# Patient Record
Sex: MALE | Employment: PART TIME | URBAN - METROPOLITAN AREA
[De-identification: names, ages, dates, MRNs, and addresses within clinical notes are randomized per-mention and may not be internally consistent; named-entity substitution may affect disease eponyms.]

---

## 2017-01-04 ENCOUNTER — OFFICE VISIT (OUTPATIENT)
Dept: FAMILY MEDICINE CLINIC | Age: 32
End: 2017-01-04

## 2017-01-04 VITALS
HEIGHT: 74 IN | WEIGHT: 275 LBS | RESPIRATION RATE: 18 BRPM | SYSTOLIC BLOOD PRESSURE: 120 MMHG | HEART RATE: 106 BPM | OXYGEN SATURATION: 97 % | TEMPERATURE: 97.5 F | BODY MASS INDEX: 35.29 KG/M2 | DIASTOLIC BLOOD PRESSURE: 83 MMHG

## 2017-01-04 DIAGNOSIS — L03.032 PARONYCHIA OF GREAT TOE OF LEFT FOOT: Primary | ICD-10-CM

## 2017-01-04 RX ORDER — CEPHALEXIN 500 MG/1
500 CAPSULE ORAL 4 TIMES DAILY
Qty: 40 CAP | Refills: 0 | Status: SHIPPED | OUTPATIENT
Start: 2017-01-04 | End: 2017-01-14

## 2017-01-04 NOTE — PATIENT INSTRUCTIONS
Take the Keflex and return in about a week for recheck. Use some gentle moisturizer on the right great toe. Ingrown Toenail: Care Instructions  Your Care Instructions    An ingrown toenail often occurs because a nail is not trimmed correctly or because shoes are too tight. An ingrown nail can cause an infection. If your toe is infected, your doctor may prescribe antibiotics. Most ingrown toenails can be treated at home. You should trim toenails straight across, so the ends of the nail grow over the skin and not into it. Good nail care can prevent ingrown toenails. Follow-up care is a key part of your treatment and safety. Be sure to make and go to all appointments, and call your doctor if you are having problems. It's also a good idea to know your test results and keep a list of the medicines you take. How can you care for yourself at home? · Trim the nails straight across. Leave the corners a little longer so they do not cut into the skin. To do this when you have an ingrown nail:  ¨ Soak your foot in warm water for about 15 minutes to soften the nail. ¨ Wedge a small piece of wet cotton under the corner of the nail to cushion the nail and lift it slightly. This keeps it from cutting the skin. ¨ Repeat daily until the nail has grown out and can be trimmed. · Do not use manicure scissors to dig under the ingrown nail. You might stab your toe, which could get infected. · Do not trim your toenails too short. · Check with your doctor before trimming your own toenails if you have been diagnosed with diabetes or peripheral arterial disease. These conditions increase the risk of an infection, because you may have decreased sensation in your toes and cut yourself without knowing it. · Wear roomy, comfortable shoes. · If your doctor prescribed antibiotics, take them as directed. Do not stop taking them just because you feel better. You need to take the full course of antibiotics.   When should you call for help? Call your doctor now or seek immediate medical care if:  · You have signs of infection, such as:  ¨ Increased pain, swelling, warmth, or redness. ¨ Red streaks leading from the toe. ¨ Pus draining from the toe. ¨ A fever. Watch closely for changes in your health, and be sure to contact your doctor if:  · You have problems trimming your nails. · You do not get better as expected. Where can you learn more? Go to http://eufemia-dakota.info/. Enter R135 in the search box to learn more about \"Ingrown Toenail: Care Instructions. \"  Current as of: May 27, 2016  Content Version: 11.1  © 1691-1292 [employer]. Care instructions adapted under license by [employer] (which disclaims liability or warranty for this information). If you have questions about a medical condition or this instruction, always ask your healthcare professional. [place] [address] any warranty or liability for your use of this information. A healthy gut contains many species of bacteria and yeast that can help with the breakdown of starches, and improve the function of the immune system (probiotics). Good gut aria also help with the absorption of nutrients such as vitamins A,D,E, and K, as well as calcium, iron, and chromium. Probiotic foods or supplements help to add microorganisms to the stomach and intestines. These foods include fermented foods such as yogurt, sauerkraut, kefir, kimchi, natto, and miso. Prebiotics are foods that help feed the existing microorganisms in the gut. These include bananas, artichokes, leeks, garlic, and onions. http://authoritynutrition. com/probiotics-101/  https://authoritynutrition.com/19-best-prebiotic-foods/  https://authoritynutrition. com/8-health-benefits-of-probiotics/

## 2017-01-04 NOTE — PROGRESS NOTES
HISTORY OF PRESENT ILLNESS  Tracy Ritchie is a 28 y.o. male. HPI Comments: Pt presents with his mom with c/o left great toe pain, redness, drainage that has been ongoing for about 2 weeks. Denies any history of ingrown toenails. He does not recall injuring the toe in the past couple of weeks. Toe Pain   Pertinent negatives include no chest pain and no shortness of breath. Review of Systems   Constitutional: Negative for chills, fever and malaise/fatigue. Respiratory: Negative for shortness of breath. Cardiovascular: Negative for chest pain. Musculoskeletal: Negative for joint pain. Neurological: Negative for tingling and weakness. Physical Exam   Constitutional: He is oriented to person, place, and time. He appears well-developed and well-nourished. No distress. HENT:   Head: Normocephalic and atraumatic. Right Ear: External ear normal.   Left Ear: External ear normal.   Nose: Nose normal. No nasal deformity. Eyes: Conjunctivae are normal.   Neck: Neck supple. Cardiovascular: Regular rhythm and normal heart sounds. Tachycardia present. Exam reveals no gallop and no friction rub. No murmur heard. Pulses:       Radial pulses are 2+ on the right side, and 2+ on the left side. Dorsalis pedis pulses are 2+ on the right side, and 2+ on the left side. Mild tachycardia  Good capillary refill in the toes   Pulmonary/Chest: Effort normal and breath sounds normal. No respiratory distress. He has no decreased breath sounds. He has no wheezes. He has no rhonchi. He has no rales. Lymphadenopathy:     He has no cervical adenopathy. Neurological: He is alert and oriented to person, place, and time. Skin: Skin is warm and dry. He is not diaphoretic. Ingrown toenail: left great toe with tenderness on the lateral aspect (toward the second toe); erythema surrounding the cuticle to about 1 cm back, and serous drainage from between the nail bed and plate.      There is also some peeling of the skin on the right great toe, with redish/purplish coloration of the skin, but no rufus erythema, no tenderness or drainage. Psychiatric: He has a normal mood and affect. His speech is normal and behavior is normal. Thought content normal.   Nursing note and vitals reviewed. ASSESSMENT and PLAN    ICD-10-CM ICD-9-CM    1. Paronychia of great toe of left foot L03.032 681.11 cephALEXin (KEFLEX) 500 mg capsule     Recommendations: Take the Keflex and return in about a week for recheck. Use some gentle moisturizer on the right great toe. Provided after-visit information on  Ingrown Toenail  Reviewed reasons to return or seek emergency care. Pt verbalized understanding and agreement with the plan of care.     Vanessa Bnetley PA-C

## 2017-01-04 NOTE — PROGRESS NOTES
Patient presents to the clinic for left great toe pain. Patient complains of redness, swelling, discoloration and drainage. Patient states he has tried bacitracin and received no relief.

## 2017-01-04 NOTE — MR AVS SNAPSHOT
Visit Information Date & Time Provider Department Dept. Phone Encounter #  
 1/4/2017  2:15 PM Marianna Baker PA-C [facility] [phone] [id number] Follow-up Instructions Return in about 1 week (around 1/11/2017). Upcoming Health Maintenance Date Due Pneumococcal 19-64 Highest Risk (1 of 3 - PCV13) 12/10/2005 DTaP/Tdap/Td series (1 - Tdap) 12/10/2007 INFLUENZA AGE 9 TO ADULT 8/1/2016 Allergies as of 1/4/2017  Review Complete On: 1/4/2017 By: Marianna Baker PA-C No Known Allergies Current Immunizations  Never Reviewed No immunizations on file. Not reviewed this visit You Were Diagnosed With   
  
 Codes Comments Paronychia of great toe of left foot    -  Primary ICD-10-CM: C68.511 
ICD-9-CM: 681.11 Vitals BP Pulse Temp Resp Height(growth percentile) Weight(growth percentile) 120/83 (BP 1 Location: Right arm, BP Patient Position: Sitting) (!) 106 97.5 °F (36.4 °C) (Oral) 18 6' 2\" (1.88 m) 275 lb (124.7 kg) SpO2 BMI Smoking Status 97% 35.31 kg/m2 Former Smoker BMI and BSA Data Body Mass Index Body Surface Area  
 35.31 kg/m 2 2.55 m 2 Preferred Pharmacy Pharmacy Name Phone [facility], [address] [phone] Your Updated Medication List  
  
   
This list is accurate as of: 1/4/17  3:02 PM.  Always use your most recent med list.  
  
  
  
  
 benztropine 1 mg tablet Commonly known as:  COGENTIN Take 1 mg by mouth two (2) times a day. cephALEXin 500 mg capsule Commonly known as:  Amedeo Ronde Take 1 Cap by mouth four (4) times daily for 10 days. cloZAPine 100 mg tablet Commonly known as:  CLOZARIL  
250 mg daily. 250 mg for two weeks then 200 mg for another two weeks then 150 mg for another two weeks COLACE 100 mg capsule Generic drug:  docusate sodium Take 100 mg by mouth two (2) times a day. FLUoxetine 40 mg capsule Commonly known as:  PROzac Take 40 mg by mouth daily. multivitamin tablet Commonly known as:  ONE A DAY Take 1 Tab by mouth daily. OLANZapine 20 mg tablet Commonly known as:  ZyPREXA Take 20 mg by mouth nightly. VITAMIN D3 1,000 unit tablet Generic drug:  cholecalciferol Take  by mouth daily. Prescriptions Sent to Pharmacy Refills  
 cephALEXin (KEFLEX) 500 mg capsule 0 Sig: Take 1 Cap by mouth four (4) times daily for 10 days. Class: Normal  
 Pharmacy: [facility] [address] [place] [address], [address] [place] [address] #: [phone] Route: Oral  
  
Follow-up Instructions Return in about 1 week (around 1/11/2017). Patient Instructions Take the Keflex and return in about a week for recheck. Use some gentle moisturizer on the right great toe. Ingrown Toenail: Care Instructions Your Care Instructions An ingrown toenail often occurs because a nail is not trimmed correctly or because shoes are too tight. An ingrown nail can cause an infection. If your toe is infected, your doctor may prescribe antibiotics. Most ingrown toenails can be treated at home. You should trim toenails straight across, so the ends of the nail grow over the skin and not into it. Good nail care can prevent ingrown toenails. Follow-up care is a key part of your treatment and safety. Be sure to make and go to all appointments, and call your doctor if you are having problems. It's also a good idea to know your test results and keep a list of the medicines you take. How can you care for yourself at home? · Trim the nails straight across. Leave the corners a little longer so they do not cut into the skin. To do this when you have an ingrown nail: 
¨ Soak your foot in warm water for about 15 minutes to soften the nail. ¨ Wedge a small piece of wet cotton under the corner of the nail to cushion the nail and lift it slightly. This keeps it from cutting the skin. ¨ Repeat daily until the nail has grown out and can be trimmed. · Do not use manicure scissors to dig under the ingrown nail. You might stab your toe, which could get infected. · Do not trim your toenails too short. · Check with your doctor before trimming your own toenails if you have been diagnosed with diabetes or peripheral arterial disease. These conditions increase the risk of an infection, because you may have decreased sensation in your toes and cut yourself without knowing it. · Wear roomy, comfortable shoes. · If your doctor prescribed antibiotics, take them as directed. Do not stop taking them just because you feel better. You need to take the full course of antibiotics. When should you call for help? Call your doctor now or seek immediate medical care if: 
· You have signs of infection, such as: 
¨ Increased pain, swelling, warmth, or redness. ¨ Red streaks leading from the toe. ¨ Pus draining from the toe. ¨ A fever. Watch closely for changes in your health, and be sure to contact your doctor if: 
· You have problems trimming your nails. · You do not get better as expected. Where can you learn more? Go to http://eufemia-dakota.info/. Enter R135 in the search box to learn more about \"Ingrown Toenail: Care Instructions. \" Current as of: May 27, 2016 Content Version: 11.1 © 6061-0629 [employer]. Care instructions adapted under license by [employer] (which disclaims liability or warranty for this information). If you have questions about a medical condition or this instruction, always ask your healthcare professional. [place] [address] any warranty or liability for your use of this information.  
 
A healthy gut contains many species of bacteria and yeast that can help with the breakdown of starches, and improve the function of the immune system (probiotics). Good gut aria also help with the absorption of nutrients such as vitamins A,D,E, and K, as well as calcium, iron, and chromium. Probiotic foods or supplements help to add microorganisms to the stomach and intestines. These foods include fermented foods such as yogurt, sauerkraut, kefir, kimchi, natto, and miso. Prebiotics are foods that help feed the existing microorganisms in the gut. These include bananas, artichokes, leeks, garlic, and onions. http://authoritynutrition. com/probiotics-101/ 
https://authoritynutrition.com/19-best-prebiotic-foods/ 
https://authoritySavveo. com/8-health-benefits-of-probiotics/ 
 
 
 
  
Introducing [facility] & HEALTH SERVICES! Dear Paige Orta: Thank you for requesting a [employer] account. Our records indicate that you already have an active [employer] account. You can access your account anytime at [email or web address] Did you know that you can access your hospital and ER discharge instructions at any time in [employer]? You can also review all of your test results from your hospital stay or ER visit. Additional Information If you have questions, please visit the Frequently Asked Questions section of the [employer] website at [email or web address] Remember, [employer] is NOT to be used for urgent needs. For medical emergencies, dial 911. Now available from your iPhone and Android! Please provide this summary of care documentation to your next provider. Your primary care clinician is listed as Chase Cortez. If you have any questions after today's visit, please call [phone].

## 2017-01-12 ENCOUNTER — HOSPITAL ENCOUNTER (OUTPATIENT)
Dept: LAB | Age: 32
Discharge: HOME | End: 2017-01-12
Payer: MEDICAID

## 2017-01-12 LAB
BASOPHILS # BLD AUTO: 0 K/UL (ref 0–0.06)
BASOPHILS # BLD: 1 % (ref 0–2)
DIFFERENTIAL METHOD BLD: NORMAL
EOSINOPHIL # BLD: 0.1 K/UL (ref 0–0.4)
EOSINOPHIL NFR BLD: 2 % (ref 0–5)
ERYTHROCYTE [DISTWIDTH] IN BLOOD BY AUTOMATED COUNT: 13.1 % (ref 11.6–14.5)
HCT VFR BLD AUTO: 46.1 % (ref 36–48)
HGB BLD-MCNC: 15.3 G/DL (ref 13–16)
LYMPHOCYTES # BLD AUTO: 31 % (ref 21–52)
LYMPHOCYTES # BLD: 1.9 K/UL (ref 0.9–3.6)
MCH RBC QN AUTO: 29.4 PG (ref 24–34)
MCHC RBC AUTO-ENTMCNC: 33.2 G/DL (ref 31–37)
MCV RBC AUTO: 88.5 FL (ref 74–97)
MONOCYTES # BLD: 0.3 K/UL (ref 0.05–1.2)
MONOCYTES NFR BLD AUTO: 5 % (ref 3–10)
NEUTS SEG # BLD: 3.7 K/UL (ref 1.8–8)
NEUTS SEG NFR BLD AUTO: 61 % (ref 40–73)
PLATELET # BLD AUTO: 274 K/UL (ref 135–420)
PMV BLD AUTO: 9.9 FL (ref 9.2–11.8)
RBC # BLD AUTO: 5.21 M/UL (ref 4.7–5.5)
WBC # BLD AUTO: 6 K/UL (ref 4.6–13.2)

## 2017-01-12 PROCEDURE — 85025 COMPLETE CBC W/AUTO DIFF WBC: CPT | Performed by: PSYCHIATRY & NEUROLOGY

## 2017-01-12 PROCEDURE — 36415 COLL VENOUS BLD VENIPUNCTURE: CPT | Performed by: PSYCHIATRY & NEUROLOGY

## 2017-01-26 ENCOUNTER — HOSPITAL ENCOUNTER (OUTPATIENT)
Dept: LAB | Age: 32
Discharge: HOME | End: 2017-01-26
Payer: MEDICAID

## 2017-01-26 LAB
BASOPHILS # BLD AUTO: 0 K/UL (ref 0–0.06)
BASOPHILS # BLD: 1 % (ref 0–2)
DIFFERENTIAL METHOD BLD: NORMAL
EOSINOPHIL # BLD: 0.1 K/UL (ref 0–0.4)
EOSINOPHIL NFR BLD: 2 % (ref 0–5)
ERYTHROCYTE [DISTWIDTH] IN BLOOD BY AUTOMATED COUNT: 13 % (ref 11.6–14.5)
HCT VFR BLD AUTO: 45.1 % (ref 36–48)
HGB BLD-MCNC: 15.4 G/DL (ref 13–16)
LYMPHOCYTES # BLD AUTO: 26 % (ref 21–52)
LYMPHOCYTES # BLD: 1.7 K/UL (ref 0.9–3.6)
MCH RBC QN AUTO: 30 PG (ref 24–34)
MCHC RBC AUTO-ENTMCNC: 34.1 G/DL (ref 31–37)
MCV RBC AUTO: 87.7 FL (ref 74–97)
MONOCYTES # BLD: 0.5 K/UL (ref 0.05–1.2)
MONOCYTES NFR BLD AUTO: 8 % (ref 3–10)
NEUTS SEG # BLD: 4.2 K/UL (ref 1.8–8)
NEUTS SEG NFR BLD AUTO: 63 % (ref 40–73)
PLATELET # BLD AUTO: 274 K/UL (ref 135–420)
PMV BLD AUTO: 9.4 FL (ref 9.2–11.8)
RBC # BLD AUTO: 5.14 M/UL (ref 4.7–5.5)
WBC # BLD AUTO: 6.6 K/UL (ref 4.6–13.2)

## 2017-01-26 PROCEDURE — 85025 COMPLETE CBC W/AUTO DIFF WBC: CPT | Performed by: PSYCHIATRY & NEUROLOGY

## 2017-01-26 PROCEDURE — 36415 COLL VENOUS BLD VENIPUNCTURE: CPT | Performed by: PSYCHIATRY & NEUROLOGY

## 2017-02-08 ENCOUNTER — HOSPITAL ENCOUNTER (OUTPATIENT)
Dept: LAB | Age: 32
Discharge: HOME | End: 2017-02-08
Payer: MEDICAID

## 2017-02-08 LAB
BASOPHILS # BLD AUTO: 0 K/UL (ref 0–0.06)
BASOPHILS # BLD: 0 % (ref 0–2)
DIFFERENTIAL METHOD BLD: NORMAL
EOSINOPHIL # BLD: 0.1 K/UL (ref 0–0.4)
EOSINOPHIL NFR BLD: 1 % (ref 0–5)
ERYTHROCYTE [DISTWIDTH] IN BLOOD BY AUTOMATED COUNT: 13 % (ref 11.6–14.5)
HCT VFR BLD AUTO: 44.7 % (ref 36–48)
HGB BLD-MCNC: 15.3 G/DL (ref 13–16)
LYMPHOCYTES # BLD AUTO: 30 % (ref 21–52)
LYMPHOCYTES # BLD: 2.2 K/UL (ref 0.9–3.6)
MCH RBC QN AUTO: 30.2 PG (ref 24–34)
MCHC RBC AUTO-ENTMCNC: 34.2 G/DL (ref 31–37)
MCV RBC AUTO: 88.2 FL (ref 74–97)
MONOCYTES # BLD: 0.2 K/UL (ref 0.05–1.2)
MONOCYTES NFR BLD AUTO: 3 % (ref 3–10)
NEUTS SEG # BLD: 4.8 K/UL (ref 1.8–8)
NEUTS SEG NFR BLD AUTO: 66 % (ref 40–73)
PLATELET # BLD AUTO: 251 K/UL (ref 135–420)
PMV BLD AUTO: 9.5 FL (ref 9.2–11.8)
RBC # BLD AUTO: 5.07 M/UL (ref 4.7–5.5)
WBC # BLD AUTO: 7.4 K/UL (ref 4.6–13.2)

## 2017-02-08 PROCEDURE — 36415 COLL VENOUS BLD VENIPUNCTURE: CPT | Performed by: PSYCHIATRY & NEUROLOGY

## 2017-02-08 PROCEDURE — 85025 COMPLETE CBC W/AUTO DIFF WBC: CPT | Performed by: PSYCHIATRY & NEUROLOGY

## 2017-06-13 ENCOUNTER — HOSPITAL ENCOUNTER (OUTPATIENT)
Dept: LAB | Age: 32
Discharge: HOME | End: 2017-06-13
Payer: SELF-PAY

## 2017-06-13 LAB
BASOPHILS # BLD AUTO: 0 K/UL (ref 0–0.06)
BASOPHILS # BLD: 0 % (ref 0–2)
DIFFERENTIAL METHOD BLD: ABNORMAL
EOSINOPHIL # BLD: 0.1 K/UL (ref 0–0.4)
EOSINOPHIL NFR BLD: 1 % (ref 0–5)
ERYTHROCYTE [DISTWIDTH] IN BLOOD BY AUTOMATED COUNT: 13.1 % (ref 11.6–14.5)
HCT VFR BLD AUTO: 38.9 % (ref 36–48)
HGB BLD-MCNC: 13.3 G/DL (ref 13–16)
LYMPHOCYTES # BLD AUTO: 29 % (ref 21–52)
LYMPHOCYTES # BLD: 1.9 K/UL (ref 0.9–3.6)
MCH RBC QN AUTO: 31.1 PG (ref 24–34)
MCHC RBC AUTO-ENTMCNC: 34.2 G/DL (ref 31–37)
MCV RBC AUTO: 90.9 FL (ref 74–97)
MONOCYTES # BLD: 0.3 K/UL (ref 0.05–1.2)
MONOCYTES NFR BLD AUTO: 5 % (ref 3–10)
NEUTS SEG # BLD: 4.3 K/UL (ref 1.8–8)
NEUTS SEG NFR BLD AUTO: 65 % (ref 40–73)
PLATELET # BLD AUTO: 269 K/UL (ref 135–420)
PMV BLD AUTO: 9.1 FL (ref 9.2–11.8)
RBC # BLD AUTO: 4.28 M/UL (ref 4.7–5.5)
WBC # BLD AUTO: 6.6 K/UL (ref 4.6–13.2)

## 2017-06-13 PROCEDURE — 85025 COMPLETE CBC W/AUTO DIFF WBC: CPT | Performed by: PSYCHIATRY & NEUROLOGY

## 2017-06-13 PROCEDURE — 36415 COLL VENOUS BLD VENIPUNCTURE: CPT | Performed by: PSYCHIATRY & NEUROLOGY

## 2017-06-19 ENCOUNTER — OFFICE VISIT (OUTPATIENT)
Dept: FAMILY MEDICINE CLINIC | Age: 32
End: 2017-06-19

## 2017-06-19 ENCOUNTER — HOSPITAL ENCOUNTER (OUTPATIENT)
Dept: LAB | Age: 32
Discharge: HOME | End: 2017-06-19
Payer: SELF-PAY

## 2017-06-19 VITALS
TEMPERATURE: 98.5 F | SYSTOLIC BLOOD PRESSURE: 122 MMHG | BODY MASS INDEX: 35.04 KG/M2 | RESPIRATION RATE: 18 BRPM | HEIGHT: 74 IN | DIASTOLIC BLOOD PRESSURE: 70 MMHG | OXYGEN SATURATION: 97 % | WEIGHT: 273 LBS | HEART RATE: 107 BPM

## 2017-06-19 DIAGNOSIS — E78.2 MIXED HYPERLIPIDEMIA: ICD-10-CM

## 2017-06-19 DIAGNOSIS — J02.9 SORE THROAT: ICD-10-CM

## 2017-06-19 DIAGNOSIS — E88.81 METABOLIC SYNDROME: Primary | ICD-10-CM

## 2017-06-19 DIAGNOSIS — Z79.899 HIGH RISK MEDICATION USE: ICD-10-CM

## 2017-06-19 LAB
ALBUMIN SERPL BCP-MCNC: 3.9 G/DL (ref 3.4–5)
ALBUMIN/GLOB SERPL: 1.3 {RATIO} (ref 0.8–1.7)
ALP SERPL-CCNC: 58 U/L (ref 45–117)
ALT SERPL-CCNC: 48 U/L (ref 16–61)
ANION GAP BLD CALC-SCNC: 9 MMOL/L (ref 3–18)
AST SERPL W P-5'-P-CCNC: 24 U/L (ref 15–37)
BILIRUB SERPL-MCNC: 0.4 MG/DL (ref 0.2–1)
BUN SERPL-MCNC: 19 MG/DL (ref 7–18)
BUN/CREAT SERPL: 19 (ref 12–20)
CALCIUM SERPL-MCNC: 9.3 MG/DL (ref 8.5–10.1)
CHLORIDE SERPL-SCNC: 102 MMOL/L (ref 100–108)
CHOLEST SERPL-MCNC: 125 MG/DL
CO2 SERPL-SCNC: 28 MMOL/L (ref 21–32)
CREAT SERPL-MCNC: 1.02 MG/DL (ref 0.6–1.3)
GLOBULIN SER CALC-MCNC: 3 G/DL (ref 2–4)
GLUCOSE SERPL-MCNC: 88 MG/DL (ref 74–99)
HBA1C MFR BLD HPLC: 5 %
HDLC SERPL-MCNC: 29 MG/DL (ref 40–60)
HDLC SERPL: 4.3 {RATIO} (ref 0–5)
LDLC SERPL CALC-MCNC: 60.8 MG/DL (ref 0–100)
LIPID PROFILE,FLP: ABNORMAL
POTASSIUM SERPL-SCNC: 4.3 MMOL/L (ref 3.5–5.5)
PROT SERPL-MCNC: 6.9 G/DL (ref 6.4–8.2)
S PYO AG THROAT QL: NEGATIVE
SODIUM SERPL-SCNC: 139 MMOL/L (ref 136–145)
TRIGL SERPL-MCNC: 176 MG/DL (ref ?–150)
VALID INTERNAL CONTROL?: YES
VLDLC SERPL CALC-MCNC: 35.2 MG/DL

## 2017-06-19 PROCEDURE — 80061 LIPID PANEL: CPT | Performed by: FAMILY MEDICINE

## 2017-06-19 PROCEDURE — 80053 COMPREHEN METABOLIC PANEL: CPT | Performed by: FAMILY MEDICINE

## 2017-06-19 RX ORDER — METFORMIN HYDROCHLORIDE 500 MG/1
TABLET ORAL
Refills: 0 | COMMUNITY
Start: 2017-06-01 | End: 2017-07-27 | Stop reason: SDUPTHER

## 2017-06-19 RX ORDER — DIVALPROEX SODIUM 500 MG/1
TABLET, DELAYED RELEASE ORAL
Refills: 0 | COMMUNITY
Start: 2017-06-01

## 2017-06-19 RX ORDER — LISINOPRIL 10 MG/1
TABLET ORAL
Refills: 0 | COMMUNITY
Start: 2017-06-01 | End: 2017-07-27 | Stop reason: SDUPTHER

## 2017-06-19 RX ORDER — FENOFIBRATE 48 MG/1
TABLET, COATED ORAL
Refills: 0 | COMMUNITY
Start: 2017-06-01 | End: 2017-07-31 | Stop reason: SDUPTHER

## 2017-06-19 NOTE — PROGRESS NOTES
HISTORY OF PRESENT ILLNESS  Thad Mead is a 28 y.o. male. HPI Comments: Toi Cruz is here to follow up medically with me. He was recently hospitalized at [facility] for mental health issues and was found to have metabolic syndrome. He was started on metformin, lisinopril and tricor a few months ago. He doesn't know what any of his numbers were. He is having no problems with the medications. He's also had a mild sore throat for a couple of days. No fever, chills. Medication Evaluation   Pertinent negatives include no chest pain, no abdominal pain, no headaches and no shortness of breath. Sore Throat    Pertinent negatives include no vomiting, no headaches, no shortness of breath and no cough. Review of Systems   Constitutional: Negative for chills and fever. HENT: Positive for sore throat. Eyes: Negative for blurred vision and double vision. Respiratory: Negative for cough and shortness of breath. Cardiovascular: Negative for chest pain and palpitations. Gastrointestinal: Negative for abdominal pain, nausea and vomiting. Genitourinary: Negative for dysuria and urgency. Neurological: Negative for headaches. Endo/Heme/Allergies: Does not bruise/bleed easily. Psychiatric/Behavioral: Negative for depression. Physical Exam   Constitutional: He is oriented to person, place, and time. He appears well-developed and well-nourished. HENT:   Throat not red   Eyes: Pupils are equal, round, and reactive to light. Neck: Neck supple. No thyromegaly present. Cardiovascular: Normal rate, regular rhythm and normal heart sounds. Pulmonary/Chest: Effort normal and breath sounds normal. No respiratory distress. He has no wheezes. He has no rales. Abdominal: Soft. He exhibits no distension. There is no tenderness. Lymphadenopathy:     He has no cervical adenopathy. Neurological: He is alert and oriented to person, place, and time.    Skin:   A couple of tiny cystic feeling lesions underneath the skin of his scalp. Nursing note and vitals reviewed. Results for orders placed or performed in visit on 06/19/17   AMB POC RAPID STREP A   Result Value Ref Range    VALID INTERNAL CONTROL POC Yes     Group A Strep Ag Negative Negative   AMB POC HEMOGLOBIN A1C   Result Value Ref Range    Hemoglobin A1c (POC) 5.0 %       ASSESSMENT and PLAN    ICD-10-CM ICD-9-CM    1. Metabolic syndrome C17.72 096.5    2. Mixed hyperlipidemia R85.1 180.5 METABOLIC PANEL, COMPREHENSIVE      LIPID PANEL   3. Sore throat J02.9 462 AMB POC RAPID STREP A      AMB POC HEMOGLOBIN A1C   4.  High risk medication use G71.623 T84.28 METABOLIC PANEL, COMPREHENSIVE

## 2017-06-19 NOTE — PROGRESS NOTES
Rm 1  Pt presents to the clinic for a follow-up. Pt was seen at [facility] and diagnosed with metabolic syndrome. Pt's mom states his psych doctor will prescribe the psych meds and wanted to get Dr. Cade Wilson on board with the others. Depression Screening Completed: yes    Learning Assessment Completed: yes    Abuse Screening Completed: n/a    Health Maintenance reviewed and discussed per provider: yes     Coordination of Care:    1. Have you been to the ER, urgent care clinic since your last visit? Hospitalized since your last visit? no    2. Have you seen or consulted any other health care providers outside of the [address] [place] [address] since your last visit? Include any pap smears or colon screening.  no

## 2017-06-19 NOTE — MR AVS SNAPSHOT
Visit Information Date & Time Provider Department Dept. Phone Encounter #  
 6/19/2017  1:30 PM [facility] [phone] [id number] Upcoming Health Maintenance Date Due Pneumococcal 19-64 Highest Risk (1 of 3 - PCV13) 12/10/2005 DTaP/Tdap/Td series (1 - Tdap) 12/10/2007 INFLUENZA AGE 9 TO ADULT 8/1/2017 Allergies as of 6/19/2017  Review Complete On: 6/19/2017 By: Codey Napier MD  
 No Known Allergies Current Immunizations  Never Reviewed No immunizations on file. Not reviewed this visit You Were Diagnosed With   
  
 Codes Comments Metabolic syndrome    -  Primary ICD-10-CM: I16.39 ICD-9-CM: 277.7 Mixed hyperlipidemia     ICD-10-CM: E78.2 ICD-9-CM: 272.2 Sore throat     ICD-10-CM: J02.9 ICD-9-CM: 341 High risk medication use     ICD-10-CM: Z79.899 ICD-9-CM: V58.69 Vitals BP Pulse Temp Resp Height(growth percentile) Weight(growth percentile) 122/70 (BP 1 Location: Right arm, BP Patient Position: Sitting) (!) 107 98.5 °F (36.9 °C) (Oral) 18 6' 2\" (1.88 m) 273 lb (123.8 kg) SpO2 BMI Smoking Status 97% 35.05 kg/m2 Former Smoker Vitals History BMI and BSA Data Body Mass Index Body Surface Area 35.05 kg/m 2 2.54 m 2 Preferred Pharmacy Pharmacy Name Phone [facility], [address] [phone] Your Updated Medication List  
  
   
This list is accurate as of: 6/19/17  2:05 PM.  Always use your most recent med list.  
  
  
  
  
 cloZAPine 100 mg tablet Commonly known as:  CLOZARIL  
250 mg daily. 250 mg for two weeks then 200 mg for another two weeks then 150 mg for another two weeks  
  
 divalproex  mg tablet Commonly known as:  DEPAKOTE TK 1 T PO BID  
  
 fenofibrate nanocrystallized 48 mg tablet Commonly known as:  [employer] TK 1 T PO HS  
  
 lisinopril 10 mg tablet Commonly known as:  Jet Bold TK 1 T PO QAM  
  
 metFORMIN 500 mg tablet Commonly known as:  GLUCOPHAGE  
TK 1 T PO QAM  
  
 multivitamin tablet Commonly known as:  ONE A DAY Take 1 Tab by mouth daily. OLANZapine 20 mg tablet Commonly known as:  ZyPREXA Take 20 mg by mouth nightly. VITAMIN D3 1,000 unit tablet Generic drug:  cholecalciferol Take  by mouth daily. We Performed the Following AMB POC HEMOGLOBIN A1C [52375 CPT(R)] AMB POC RAPID STREP A [83962 CPT(R)] To-Do List   
 06/19/2017 Lab:  LIPID PANEL   
  
 06/19/2017 Lab:  METABOLIC PANEL, COMPREHENSIVE Patient Instructions We will call you tomorrow about the lab results. Continue everything the same. Introducing [facility] & HEALTH SERVICES! Dear Dell Cons: Thank you for requesting a [employer] account. Our records indicate that you already have an active [employer] account. You can access your account anytime at [email or web address] Did you know that you can access your hospital and ER discharge instructions at any time in [employer]? You can also review all of your test results from your hospital stay or ER visit. Additional Information If you have questions, please visit the Frequently Asked Questions section of the [employer] website at [email or web address] Remember, [employer] is NOT to be used for urgent needs. For medical emergencies, dial 911. Now available from your iPhone and Android! Please provide this summary of care documentation to your next provider. Your primary care clinician is listed as Chase Cortez. If you have any questions after today's visit, please call [phone].

## 2017-07-13 ENCOUNTER — HOSPITAL ENCOUNTER (OUTPATIENT)
Dept: LAB | Age: 32
Discharge: HOME | End: 2017-07-13
Payer: SELF-PAY

## 2017-07-13 LAB
BASOPHILS # BLD AUTO: 0 K/UL (ref 0–0.06)
BASOPHILS # BLD: 1 % (ref 0–2)
DIFFERENTIAL METHOD BLD: ABNORMAL
EOSINOPHIL # BLD: 0.1 K/UL (ref 0–0.4)
EOSINOPHIL NFR BLD: 2 % (ref 0–5)
ERYTHROCYTE [DISTWIDTH] IN BLOOD BY AUTOMATED COUNT: 12 % (ref 11.6–14.5)
HCT VFR BLD AUTO: 44.2 % (ref 36–48)
HGB BLD-MCNC: 15 G/DL (ref 13–16)
LYMPHOCYTES # BLD AUTO: 32 % (ref 21–52)
LYMPHOCYTES # BLD: 1.9 K/UL (ref 0.9–3.6)
MCH RBC QN AUTO: 31 PG (ref 24–34)
MCHC RBC AUTO-ENTMCNC: 33.9 G/DL (ref 31–37)
MCV RBC AUTO: 91.3 FL (ref 74–97)
MONOCYTES # BLD: 0.4 K/UL (ref 0.05–1.2)
MONOCYTES NFR BLD AUTO: 7 % (ref 3–10)
NEUTS SEG # BLD: 3.6 K/UL (ref 1.8–8)
NEUTS SEG NFR BLD AUTO: 58 % (ref 40–73)
PLATELET # BLD AUTO: 285 K/UL (ref 135–420)
PMV BLD AUTO: 9 FL (ref 9.2–11.8)
RBC # BLD AUTO: 4.84 M/UL (ref 4.7–5.5)
WBC # BLD AUTO: 6 K/UL (ref 4.6–13.2)

## 2017-07-13 PROCEDURE — 85025 COMPLETE CBC W/AUTO DIFF WBC: CPT | Performed by: PSYCHIATRY & NEUROLOGY

## 2017-07-13 PROCEDURE — 36415 COLL VENOUS BLD VENIPUNCTURE: CPT | Performed by: PSYCHIATRY & NEUROLOGY

## 2017-07-27 RX ORDER — METFORMIN HYDROCHLORIDE 500 MG/1
500 TABLET ORAL
Qty: 30 TAB | Refills: 2 | Status: SHIPPED | OUTPATIENT
Start: 2017-07-27 | End: 2017-11-07 | Stop reason: SDUPTHER

## 2017-07-27 RX ORDER — LISINOPRIL 10 MG/1
10 TABLET ORAL DAILY
Qty: 30 TAB | Refills: 2 | Status: SHIPPED | OUTPATIENT
Start: 2017-07-27 | End: 2017-11-07 | Stop reason: SDUPTHER

## 2017-07-27 NOTE — TELEPHONE ENCOUNTER
Please call pt (or mom) back and confirm the dose and regimen (qd, bid, etc.) for the lisinopril and metformin.

## 2017-07-27 NOTE — TELEPHONE ENCOUNTER
Spoke with patient's mom Tushar. Tushar stated her son takes 1 tablet everyday for both medications.

## 2017-07-27 NOTE — TELEPHONE ENCOUNTER
Requested Prescriptions     Pending Prescriptions Disp Refills    lisinopril (PRINIVIL, ZESTRIL) 10 mg tablet  0    metFORMIN (GLUCOPHAGE) 500 mg tablet  0     Pts mother stated she had run these meds by Dr Catarino Taylor and would let him know when it was getting to be the time to get them refilled. It looks as though they were prescribed by a historical provider, but she had discussed the matter with Dr Catarino Taylor.

## 2017-07-31 RX ORDER — FENOFIBRATE 48 MG/1
TABLET, COATED ORAL
Qty: 90 TAB | Refills: 0 | Status: SHIPPED | OUTPATIENT
Start: 2017-07-31 | End: 2017-09-18 | Stop reason: SDUPTHER

## 2017-08-07 ENCOUNTER — HOSPITAL ENCOUNTER (OUTPATIENT)
Dept: LAB | Age: 32
Discharge: HOME | End: 2017-08-07
Payer: MEDICAID

## 2017-08-07 LAB
25(OH)D3 SERPL-MCNC: 24.2 NG/ML (ref 30–100)
ALBUMIN SERPL BCP-MCNC: 3.9 G/DL (ref 3.4–5)
ALBUMIN/GLOB SERPL: 1.1 {RATIO} (ref 0.8–1.7)
ALP SERPL-CCNC: 54 U/L (ref 45–117)
ALT SERPL-CCNC: 25 U/L (ref 16–61)
ANION GAP BLD CALC-SCNC: 9 MMOL/L (ref 3–18)
AST SERPL W P-5'-P-CCNC: 19 U/L (ref 15–37)
BASOPHILS # BLD AUTO: 0 K/UL (ref 0–0.06)
BASOPHILS # BLD: 0 % (ref 0–2)
BILIRUB SERPL-MCNC: 0.5 MG/DL (ref 0.2–1)
BUN SERPL-MCNC: 20 MG/DL (ref 7–18)
BUN/CREAT SERPL: 18 (ref 12–20)
CALCIUM SERPL-MCNC: 9.3 MG/DL (ref 8.5–10.1)
CHLORIDE SERPL-SCNC: 105 MMOL/L (ref 100–108)
CHOLEST SERPL-MCNC: 122 MG/DL
CO2 SERPL-SCNC: 28 MMOL/L (ref 21–32)
CREAT SERPL-MCNC: 1.1 MG/DL (ref 0.6–1.3)
DIFFERENTIAL METHOD BLD: ABNORMAL
EOSINOPHIL # BLD: 0.1 K/UL (ref 0–0.4)
EOSINOPHIL NFR BLD: 1 % (ref 0–5)
ERYTHROCYTE [DISTWIDTH] IN BLOOD BY AUTOMATED COUNT: 11.8 % (ref 11.6–14.5)
GLOBULIN SER CALC-MCNC: 3.5 G/DL (ref 2–4)
GLUCOSE SERPL-MCNC: 84 MG/DL (ref 74–99)
HBA1C MFR BLD: 5.4 % (ref 4.2–5.6)
HCT VFR BLD AUTO: 41.9 % (ref 36–48)
HDLC SERPL-MCNC: 29 MG/DL (ref 40–60)
HDLC SERPL: 4.2 {RATIO} (ref 0–5)
HGB BLD-MCNC: 14.5 G/DL (ref 13–16)
LDLC SERPL CALC-MCNC: 64.4 MG/DL (ref 0–100)
LIPID PROFILE,FLP: ABNORMAL
LYMPHOCYTES # BLD AUTO: 32 % (ref 21–52)
LYMPHOCYTES # BLD: 1.6 K/UL (ref 0.9–3.6)
MCH RBC QN AUTO: 31.5 PG (ref 24–34)
MCHC RBC AUTO-ENTMCNC: 34.6 G/DL (ref 31–37)
MCV RBC AUTO: 90.9 FL (ref 74–97)
MONOCYTES # BLD: 0.3 K/UL (ref 0.05–1.2)
MONOCYTES NFR BLD AUTO: 5 % (ref 3–10)
NEUTS SEG # BLD: 3.1 K/UL (ref 1.8–8)
NEUTS SEG NFR BLD AUTO: 62 % (ref 40–73)
PLATELET # BLD AUTO: 256 K/UL (ref 135–420)
PMV BLD AUTO: 9.3 FL (ref 9.2–11.8)
POTASSIUM SERPL-SCNC: 4.8 MMOL/L (ref 3.5–5.5)
PROT SERPL-MCNC: 7.4 G/DL (ref 6.4–8.2)
RBC # BLD AUTO: 4.61 M/UL (ref 4.7–5.5)
SODIUM SERPL-SCNC: 142 MMOL/L (ref 136–145)
TRIGL SERPL-MCNC: 143 MG/DL (ref ?–150)
TSH SERPL DL<=0.05 MIU/L-ACNC: 1.42 UIU/ML (ref 0.36–3.74)
VLDLC SERPL CALC-MCNC: 28.6 MG/DL
WBC # BLD AUTO: 5.1 K/UL (ref 4.6–13.2)

## 2017-08-07 PROCEDURE — 36415 COLL VENOUS BLD VENIPUNCTURE: CPT | Performed by: PSYCHIATRY & NEUROLOGY

## 2017-08-07 PROCEDURE — 80053 COMPREHEN METABOLIC PANEL: CPT | Performed by: PSYCHIATRY & NEUROLOGY

## 2017-08-07 PROCEDURE — 80061 LIPID PANEL: CPT | Performed by: PSYCHIATRY & NEUROLOGY

## 2017-08-07 PROCEDURE — 80165 DIPROPYLACETIC ACID FREE: CPT | Performed by: PSYCHIATRY & NEUROLOGY

## 2017-08-07 PROCEDURE — 84443 ASSAY THYROID STIM HORMONE: CPT | Performed by: PSYCHIATRY & NEUROLOGY

## 2017-08-07 PROCEDURE — 83036 HEMOGLOBIN GLYCOSYLATED A1C: CPT | Performed by: PSYCHIATRY & NEUROLOGY

## 2017-08-07 PROCEDURE — 85025 COMPLETE CBC W/AUTO DIFF WBC: CPT | Performed by: PSYCHIATRY & NEUROLOGY

## 2017-08-07 PROCEDURE — 82306 VITAMIN D 25 HYDROXY: CPT | Performed by: PSYCHIATRY & NEUROLOGY

## 2017-08-09 LAB — VALPROATE FREE SERPL-MCNC: 10.5 UG/ML (ref 6–22)

## 2017-09-05 ENCOUNTER — HOSPITAL ENCOUNTER (OUTPATIENT)
Dept: LAB | Age: 32
Discharge: HOME | End: 2017-09-05
Payer: MEDICAID

## 2017-09-05 LAB
BASOPHILS # BLD: 0 K/UL (ref 0–0.06)
BASOPHILS NFR BLD: 0 % (ref 0–2)
DIFFERENTIAL METHOD BLD: ABNORMAL
EOSINOPHIL # BLD: 0.1 K/UL (ref 0–0.4)
EOSINOPHIL NFR BLD: 2 % (ref 0–5)
ERYTHROCYTE [DISTWIDTH] IN BLOOD BY AUTOMATED COUNT: 12.2 % (ref 11.6–14.5)
HCT VFR BLD AUTO: 42.7 % (ref 36–48)
HGB BLD-MCNC: 14.6 G/DL (ref 13–16)
LYMPHOCYTES # BLD: 1.8 K/UL (ref 0.9–3.6)
LYMPHOCYTES NFR BLD: 38 % (ref 21–52)
MCH RBC QN AUTO: 31.3 PG (ref 24–34)
MCHC RBC AUTO-ENTMCNC: 34.2 G/DL (ref 31–37)
MCV RBC AUTO: 91.4 FL (ref 74–97)
MONOCYTES # BLD: 0.3 K/UL (ref 0.05–1.2)
MONOCYTES NFR BLD: 7 % (ref 3–10)
NEUTS SEG # BLD: 2.6 K/UL (ref 1.8–8)
NEUTS SEG NFR BLD: 53 % (ref 40–73)
PLATELET # BLD AUTO: 250 K/UL (ref 135–420)
PMV BLD AUTO: 9.4 FL (ref 9.2–11.8)
RBC # BLD AUTO: 4.67 M/UL (ref 4.7–5.5)
WBC # BLD AUTO: 4.8 K/UL (ref 4.6–13.2)

## 2017-09-05 PROCEDURE — 85025 COMPLETE CBC W/AUTO DIFF WBC: CPT | Performed by: PSYCHIATRY & NEUROLOGY

## 2017-09-05 PROCEDURE — 36415 COLL VENOUS BLD VENIPUNCTURE: CPT | Performed by: PSYCHIATRY & NEUROLOGY

## 2017-09-18 ENCOUNTER — OFFICE VISIT (OUTPATIENT)
Dept: FAMILY MEDICINE CLINIC | Age: 32
End: 2017-09-18

## 2017-09-18 VITALS
HEIGHT: 74 IN | RESPIRATION RATE: 16 BRPM | DIASTOLIC BLOOD PRESSURE: 80 MMHG | HEART RATE: 85 BPM | BODY MASS INDEX: 35.55 KG/M2 | WEIGHT: 277 LBS | OXYGEN SATURATION: 96 % | SYSTOLIC BLOOD PRESSURE: 123 MMHG | TEMPERATURE: 96.8 F

## 2017-09-18 DIAGNOSIS — E88.81 METABOLIC SYNDROME: ICD-10-CM

## 2017-09-18 DIAGNOSIS — E55.9 VITAMIN D DEFICIENCY: ICD-10-CM

## 2017-09-18 DIAGNOSIS — E78.2 MIXED HYPERLIPIDEMIA: Primary | ICD-10-CM

## 2017-09-18 DIAGNOSIS — F25.9 SCHIZOAFFECTIVE DISORDER, UNSPECIFIED TYPE (HCC): ICD-10-CM

## 2017-09-18 RX ORDER — ERGOCALCIFEROL 1.25 MG/1
CAPSULE ORAL
Refills: 2 | COMMUNITY
Start: 2017-09-06 | End: 2018-09-05 | Stop reason: ALTCHOICE

## 2017-09-18 RX ORDER — FENOFIBRATE 48 MG/1
TABLET, COATED ORAL
Qty: 90 TAB | Refills: 1 | Status: SHIPPED | OUTPATIENT
Start: 2017-09-18 | End: 2017-11-07 | Stop reason: SDUPTHER

## 2017-09-18 NOTE — PROGRESS NOTES
Patient presents to the clinic to follow up on blood sugar and cholesterol. Patient would also like a refill of his medication.     Requested Prescriptions     Pending Prescriptions Disp Refills    fenofibrate nanocrystallized (TRICOR) 48 mg tablet 90 Tab 0     Sig: TK 1 T PO HS

## 2017-09-18 NOTE — MR AVS SNAPSHOT
Visit Information Date & Time Provider Department Dept. Phone Encounter #  
 9/18/2017 10:30 AM Robby Celestin MD [facility] [phone] [id number] Upcoming Health Maintenance Date Due DTaP/Tdap/Td series (1 - Tdap) 12/10/2007 INFLUENZA AGE 9 TO ADULT 8/1/2017 Allergies as of 9/18/2017  Review Complete On: 9/18/2017 By: Robby Celestin MD  
 No Known Allergies Current Immunizations  Never Reviewed No immunizations on file. Not reviewed this visit You Were Diagnosed With   
  
 Codes Comments Mixed hyperlipidemia    -  Primary ICD-10-CM: Q50.3 ICD-9-CM: 272.2 Metabolic syndrome     Psychiatric-87-UP: V81.77 ICD-9-CM: 277.7 [facility]oaffective disorder, unspecified [facility]     ICD-10-CM: F25.9 ICD-9-CM: 295.70 Vitals BP Pulse Temp Resp Height(growth percentile) Weight(growth percentile) 123/80 (BP 1 Location: Right arm, BP Patient Position: Sitting) 85 96.8 °F (36 °C) (Oral) 16 6' 2\" (1.88 m) 277 lb (125.6 kg) SpO2 BMI Smoking Status 96% 35.56 kg/m2 Former Smoker BMI and BSA Data Body Mass Index Body Surface Area 35.56 kg/m 2 2.56 m 2 Preferred Pharmacy Pharmacy Name Phone [place], [address] [phone] Your Updated Medication List  
  
   
This list is accurate as of: 9/18/17 10:57 AM.  Always use your most recent med list.  
  
  
  
  
 cloZAPine 100 mg tablet Commonly known as:  CLOZARIL  
250 mg nightly. At bedtime. divalproex  mg tablet Commonly known as:  DEPAKOTE TK 1 T PO BID  
  
 ergocalciferol 50,000 unit capsule Commonly known as:  ERGOCALCIFEROL TK 1 C PO WEEKLY  
  
 fenofibrate nanocrystallized 48 mg tablet Commonly known as:  [employer] TK 1 T PO HS  
  
 lisinopril 10 mg tablet Commonly known as:  Sadia Golder Take 1 Tab by mouth daily. metFORMIN 500 mg tablet Commonly known as:  GLUCOPHAGE Take 1 Tab by mouth daily (with breakfast). multivitamin tablet Commonly known as:  ONE A DAY Take 1 Tab by mouth daily. OLANZapine 20 mg tablet Commonly known as:  ZyPREXA Take 20 mg by mouth nightly. VITAMIN D3 1,000 unit tablet Generic drug:  cholecalciferol Take  by mouth daily. Prescriptions Sent to Pharmacy Refills  
 fenofibrate nanocrystallized (TRICOR) 48 mg tablet 1 Sig: TK 1 T PO HS  
 Class: Normal  
 Pharmacy: [facility] [address] [place] [address], [address] [place] [address] #: [phone] Patient Instructions Continue everything the same. Try to increase exercise, even walking. I recommend flu shot in October. Low HDL Cholesterol: After Your Visit Your Care Instructions Cholesterol is a type of fat in your blood. It is needed for many body functions, such as making new cells. Cholesterol is made by your body and also comes from food you eat. HDL (high-density lipoprotein) is the \"good\" cholesterol. Low levels of HDL can increase your risk of having a heart attack or stroke. It is best if your HDL level is at least 40 (measured in milligrams per deciliter, or mg/dL). Low HDL usually is caused by a poor diet and a lack of exercise. You may raise your HDL level by eating less animal fat and more vegetables. Getting regular exercise can also help. But for some people, low HDL runs in the family. If changes in diet and exercise do not raise your HDL level, your doctor may recommend medicine. Follow-up care is a key part of your treatment and safety. Be sure to make and go to all appointments, and call your doctor if you are having problems. Its also a good idea to know your test results and keep a list of the medicines you take. How can you care for yourself at home? · Eat a healthy diet. Read food labels and try to avoid saturated fat and trans fat. ¨ Limit the amount of fatty meat and milk products you eat. Choose low-fat meats, such as skinless chicken breasts, and low-fat or nonfat dairy products. ¨ Bake, broil, grill, or steam foods instead of frying them. Avoid fried foods. ¨ Eat foods with whole grains, such as whole wheat bread, instead of white bread. Eat brown rice instead of white rice. ¨ Try not to eat liver and eggs. They contain a lot of \"bad\" cholesterol. Rozanne Severance with oils such as olive, canola, or peanut oil. ¨ Eat beans and peas for protein. · Try to lose weight if you are overweight. · Get regular exercise. Even mild regular exercise alone may increase your HDL level. Walking is a good choice. Bit by bit, increase the amount you walk every day. Try for at least 30 minutes on most days of the week. You also may want to swim, bike, or do other activities. · Stop smoking, which can lower your HDL level. If you need help quitting, talk to your doctor about stop-smoking programs and medicines. These can increase your chances of quitting for good. · Take your medicines exactly as prescribed. Call your doctor if you think you are having a problem with your medicine. When should you call for help? Call 911 anytime you think you may need emergency care. For example, call if: 
· You have signs of a stroke. These may include: 
¨ Sudden numbness, paralysis, or weakness in your face, arm, or leg, especially on only one side of your body. ¨ New problems with walking or balance. ¨ Sudden vision changes. ¨ Drooling or slurred speech. ¨ New problems speaking or understanding simple statements, or feeling confused. ¨ A sudden, severe headache that is different from past headaches. · You have symptoms of a heart attack. These may include: ¨ Chest pain or pressure, or a strange feeling in the chest. 
¨ Sweating. ¨ Shortness of breath. ¨ Nausea or vomiting. ¨ Pain, pressure, or a strange feeling in the back, neck, jaw, or upper belly or in one or both shoulders or arms. ¨ Lightheadedness or sudden weakness. ¨ A fast or irregular heartbeat. After you call 911, the  may tell you to chew 1 adult-strength or 2 to 4 low-dose aspirin. Wait for an ambulance. Do not try to drive yourself. Watch closely for changes in your health, and be sure to contact your doctor if: 
· Your HDL level does not increase after making diet and exercise changes. · You are worried about your cholesterol level. · You do not get better as expected. Where can you learn more? Go to [employer].be Enter L121 in the search box to learn more about \"Low HDL Cholesterol: After Your Visit. \"  
© 6197-5847 Healthwise, Incorporated. Care instructions adapted under license by Sride[employer] Orellana (which disclaims liability or warranty for this information). This care instruction is for use with your licensed healthcare professional. If you have questions about a medical condition or this instruction, always ask your healthcare professional. [address] any warranty or liability for your use of this information. Content Version: 2.7.772028; Last Revised: October 13, 2011 Introducing [facility] & HEALTH SERVICES! Dear Gabo Rodriguez: Thank you for requesting a [employer] account. Our records indicate that you already have an active [employer] account. You can access your account anytime at [email or web address] Did you know that you can access your hospital and ER discharge instructions at any time in [employer]? You can also review all of your test results from your hospital stay or ER visit. Additional Information If you have questions, please visit the Frequently Asked Questions section of the [employer] website at [email or web address] Remember, [employer] is NOT to be used for urgent needs.  For medical emergencies, dial 911. Now available from your iPhone and Android! Please provide this summary of care documentation to your next provider. Your primary care clinician is listed as Chase Cortez. If you have any questions after today's visit, please call [phone].

## 2017-09-18 NOTE — PROGRESS NOTES
HISTORY OF PRESENT ILLNESS  Khadra Patiño is a 28 y.o. male. HPI Comments: Rudolph Rodas is here for his scheduled 3 month follow up. He was diagnosed with Metabolic Syndrome a year ago and has been taking Tricor and needs a refill. He sees Dr. Jeovanny Rosado for his psych care and she has been ordering his cbc because of the high risk for neutropenia. She ordered roby labs last month (CMP, TSH, Vitamin D) and they were normal except for mildly low Vitamin D level. As far as his metabolic syndrome goes, other than take Tricor he doesn't do much for it. He doesn't exercise, hasn't attempted to lose weight. He doesn't usually get flu shots, will think about it, VIS given. Blood sugar problem   Pertinent negatives include no chest pain, no abdominal pain, no headaches and no shortness of breath. Medication Refill   Pertinent negatives include no chest pain, no abdominal pain, no headaches and no shortness of breath. Review of Systems   Constitutional: Negative for chills and fever. HENT: Negative for sore throat. Eyes: Negative for blurred vision and double vision. Respiratory: Negative for cough and shortness of breath. Cardiovascular: Negative for chest pain and palpitations. Gastrointestinal: Negative for abdominal pain, nausea and vomiting. Genitourinary: Negative for dysuria and urgency. Musculoskeletal: Negative for myalgias. Neurological: Negative for headaches. Physical Exam   Constitutional: He is oriented to person, place, and time. He appears well-developed and well-nourished. Eyes: Pupils are equal, round, and reactive to light. Neck: Neck supple. No thyromegaly present. Cardiovascular: Normal rate and regular rhythm. Pulmonary/Chest: Effort normal and breath sounds normal. No respiratory distress. He has no wheezes. He has no rales. Abdominal: Soft. He exhibits no distension. Lymphadenopathy:     He has no cervical adenopathy.    Neurological: He is alert and oriented to person, place, and time. Psychiatric: He has a normal mood and affect. Nursing note and vitals reviewed. I have reviewed/discussed the above normal BMI with the patient and mother. I have recommended the following interventions: dietary management education, guidance, and counseling and encourage exercise . Jeff Kelly ASSESSMENT and PLAN    ICD-10-CM ICD-9-CM    1. Mixed hyperlipidemia E78.2 272.2    2. Metabolic syndrome Z82.07 763.7    3. Schizoaffective disorder, unspecified type ([facility] F25.9 295.70    4.  Vitamin D deficiency E55.9 268.9      AVS instructions reviewed with patient, pt verbalized understanding

## 2017-09-18 NOTE — PATIENT INSTRUCTIONS
Continue everything the same. Try to increase exercise, even walking. I recommend flu shot in October. Low HDL Cholesterol: After Your Visit  Your Care Instructions  Cholesterol is a type of fat in your blood. It is needed for many body functions, such as making new cells. Cholesterol is made by your body and also comes from food you eat. HDL (high-density lipoprotein) is the \"good\" cholesterol. Low levels of HDL can increase your risk of having a heart attack or stroke. It is best if your HDL level is at least 40 (measured in milligrams per deciliter, or mg/dL). Low HDL usually is caused by a poor diet and a lack of exercise. You may raise your HDL level by eating less animal fat and more vegetables. Getting regular exercise can also help. But for some people, low HDL runs in the family. If changes in diet and exercise do not raise your HDL level, your doctor may recommend medicine. Follow-up care is a key part of your treatment and safety. Be sure to make and go to all appointments, and call your doctor if you are having problems. Its also a good idea to know your test results and keep a list of the medicines you take. How can you care for yourself at home? · Eat a healthy diet. Read food labels and try to avoid saturated fat and trans fat. ¨ Limit the amount of fatty meat and milk products you eat. Choose low-fat meats, such as skinless chicken breasts, and low-fat or nonfat dairy products. ¨ Bake, broil, grill, or steam foods instead of frying them. Avoid fried foods. ¨ Eat foods with whole grains, such as whole wheat bread, instead of white bread. Eat brown rice instead of white rice. ¨ Try not to eat liver and eggs. They contain a lot of \"bad\" cholesterol. Marny Callahan with oils such as olive, canola, or peanut oil. ¨ Eat beans and peas for protein. · Try to lose weight if you are overweight. · Get regular exercise. Even mild regular exercise alone may increase your HDL level.  Walking is a good choice. Bit by bit, increase the amount you walk every day. Try for at least 30 minutes on most days of the week. You also may want to swim, bike, or do other activities. · Stop smoking, which can lower your HDL level. If you need help quitting, talk to your doctor about stop-smoking programs and medicines. These can increase your chances of quitting for good. · Take your medicines exactly as prescribed. Call your doctor if you think you are having a problem with your medicine. When should you call for help? Call 911 anytime you think you may need emergency care. For example, call if:  · You have signs of a stroke. These may include:  ¨ Sudden numbness, paralysis, or weakness in your face, arm, or leg, especially on only one side of your body. ¨ New problems with walking or balance. ¨ Sudden vision changes. ¨ Drooling or slurred speech. ¨ New problems speaking or understanding simple statements, or feeling confused. ¨ A sudden, severe headache that is different from past headaches. · You have symptoms of a heart attack. These may include:  ¨ Chest pain or pressure, or a strange feeling in the chest.  ¨ Sweating. ¨ Shortness of breath. ¨ Nausea or vomiting. ¨ Pain, pressure, or a strange feeling in the back, neck, jaw, or upper belly or in one or both shoulders or arms. ¨ Lightheadedness or sudden weakness. ¨ A fast or irregular heartbeat. After you call 911, the  may tell you to chew 1 adult-strength or 2 to 4 low-dose aspirin. Wait for an ambulance. Do not try to drive yourself. Watch closely for changes in your health, and be sure to contact your doctor if:  · Your HDL level does not increase after making diet and exercise changes. · You are worried about your cholesterol level. · You do not get better as expected. Where can you learn more? Go to [employer].be  Enter L121 in the search box to learn more about \"Low HDL Cholesterol: After Your Visit. \"   © 0582-4163 Healthwise, Incorporated. Care instructions adapted under license by Anisa Ellison (which disclaims liability or warranty for this information). This care instruction is for use with your licensed healthcare professional. If you have questions about a medical condition or this instruction, always ask your healthcare professional. [address] any warranty or liability for your use of this information.   Content Version: 8.6.121524; Last Revised: October 13, 2011

## 2017-10-03 ENCOUNTER — HOSPITAL ENCOUNTER (OUTPATIENT)
Dept: LAB | Age: 32
Discharge: HOME | End: 2017-10-03
Payer: MEDICAID

## 2017-10-03 LAB
BASOPHILS # BLD: 0 K/UL (ref 0–0.06)
BASOPHILS NFR BLD: 0 % (ref 0–2)
DIFFERENTIAL METHOD BLD: NORMAL
EOSINOPHIL # BLD: 0.1 K/UL (ref 0–0.4)
EOSINOPHIL NFR BLD: 1 % (ref 0–5)
ERYTHROCYTE [DISTWIDTH] IN BLOOD BY AUTOMATED COUNT: 12.2 % (ref 11.6–14.5)
HCT VFR BLD AUTO: 43 % (ref 36–48)
HGB BLD-MCNC: 14.8 G/DL (ref 13–16)
LYMPHOCYTES # BLD: 2 K/UL (ref 0.9–3.6)
LYMPHOCYTES NFR BLD: 33 % (ref 21–52)
MCH RBC QN AUTO: 30.8 PG (ref 24–34)
MCHC RBC AUTO-ENTMCNC: 34.4 G/DL (ref 31–37)
MCV RBC AUTO: 89.4 FL (ref 74–97)
MONOCYTES # BLD: 0.5 K/UL (ref 0.05–1.2)
MONOCYTES NFR BLD: 8 % (ref 3–10)
NEUTS SEG # BLD: 3.5 K/UL (ref 1.8–8)
NEUTS SEG NFR BLD: 58 % (ref 40–73)
PLATELET # BLD AUTO: 248 K/UL (ref 135–420)
PMV BLD AUTO: 9.6 FL (ref 9.2–11.8)
RBC # BLD AUTO: 4.81 M/UL (ref 4.7–5.5)
WBC # BLD AUTO: 6.1 K/UL (ref 4.6–13.2)

## 2017-10-03 PROCEDURE — 36415 COLL VENOUS BLD VENIPUNCTURE: CPT | Performed by: PSYCHIATRY & NEUROLOGY

## 2017-10-03 PROCEDURE — 85025 COMPLETE CBC W/AUTO DIFF WBC: CPT | Performed by: PSYCHIATRY & NEUROLOGY

## 2017-10-31 ENCOUNTER — HOSPITAL ENCOUNTER (OUTPATIENT)
Dept: LAB | Age: 32
Discharge: HOME | End: 2017-10-31
Payer: SELF-PAY

## 2017-10-31 LAB
BASOPHILS # BLD: 0 K/UL (ref 0–0.06)
BASOPHILS NFR BLD: 0 % (ref 0–2)
DIFFERENTIAL METHOD BLD: NORMAL
EOSINOPHIL # BLD: 0.1 K/UL (ref 0–0.4)
EOSINOPHIL NFR BLD: 1 % (ref 0–5)
ERYTHROCYTE [DISTWIDTH] IN BLOOD BY AUTOMATED COUNT: 12.4 % (ref 11.6–14.5)
HCT VFR BLD AUTO: 43.6 % (ref 36–48)
HGB BLD-MCNC: 14.9 G/DL (ref 13–16)
LYMPHOCYTES # BLD: 1.9 K/UL (ref 0.9–3.6)
LYMPHOCYTES NFR BLD: 28 % (ref 21–52)
MCH RBC QN AUTO: 30.8 PG (ref 24–34)
MCHC RBC AUTO-ENTMCNC: 34.2 G/DL (ref 31–37)
MCV RBC AUTO: 90.1 FL (ref 74–97)
MONOCYTES # BLD: 0.4 K/UL (ref 0.05–1.2)
MONOCYTES NFR BLD: 6 % (ref 3–10)
NEUTS SEG # BLD: 4.2 K/UL (ref 1.8–8)
NEUTS SEG NFR BLD: 65 % (ref 40–73)
PLATELET # BLD AUTO: 274 K/UL (ref 135–420)
PMV BLD AUTO: 9.4 FL (ref 9.2–11.8)
RBC # BLD AUTO: 4.84 M/UL (ref 4.7–5.5)
WBC # BLD AUTO: 6.5 K/UL (ref 4.6–13.2)

## 2017-10-31 PROCEDURE — 36415 COLL VENOUS BLD VENIPUNCTURE: CPT | Performed by: PSYCHIATRY & NEUROLOGY

## 2017-10-31 PROCEDURE — 85025 COMPLETE CBC W/AUTO DIFF WBC: CPT | Performed by: PSYCHIATRY & NEUROLOGY

## 2017-11-07 RX ORDER — METFORMIN HYDROCHLORIDE 500 MG/1
500 TABLET ORAL
Qty: 30 TAB | Refills: 2 | Status: SHIPPED | OUTPATIENT
Start: 2017-11-07 | End: 2017-12-11 | Stop reason: SDUPTHER

## 2017-11-07 RX ORDER — LISINOPRIL 10 MG/1
10 TABLET ORAL DAILY
Qty: 30 TAB | Refills: 2 | Status: SHIPPED | OUTPATIENT
Start: 2017-11-07 | End: 2017-12-11 | Stop reason: SDUPTHER

## 2017-11-07 RX ORDER — FENOFIBRATE 48 MG/1
TABLET, COATED ORAL
Qty: 90 TAB | Refills: 1 | Status: SHIPPED | OUTPATIENT
Start: 2017-11-07 | End: 2017-12-11 | Stop reason: SDUPTHER

## 2017-11-07 NOTE — TELEPHONE ENCOUNTER
Requested Prescriptions     Pending Prescriptions Disp Refills    metFORMIN (GLUCOPHAGE) 500 mg tablet 30 Tab 2     Sig: Take 1 Tab by mouth daily (with breakfast).  lisinopril (PRINIVIL, ZESTRIL) 10 mg tablet 30 Tab 2     Sig: Take 1 Tab by mouth daily.  fenofibrate nanocrystallized (TRICOR) 48 mg tablet 90 Tab 1     Sig: TK 1 T PO HS     Zero refills on Metformin and Lisinoprol and One left on Tricor. Pts mother would like to get all these back on the same track with the refills happening at the same time. How can they make this happen safely?

## 2017-11-28 ENCOUNTER — HOSPITAL ENCOUNTER (OUTPATIENT)
Dept: LAB | Age: 32
Discharge: HOME | End: 2017-11-28
Payer: SELF-PAY

## 2017-11-28 LAB
BASOPHILS # BLD: 0 K/UL (ref 0–0.06)
BASOPHILS NFR BLD: 1 % (ref 0–2)
DIFFERENTIAL METHOD BLD: NORMAL
EOSINOPHIL # BLD: 0.1 K/UL (ref 0–0.4)
EOSINOPHIL NFR BLD: 2 % (ref 0–5)
ERYTHROCYTE [DISTWIDTH] IN BLOOD BY AUTOMATED COUNT: 12.5 % (ref 11.6–14.5)
HCT VFR BLD AUTO: 45.1 % (ref 36–48)
HGB BLD-MCNC: 15.3 G/DL (ref 13–16)
LYMPHOCYTES # BLD: 2 K/UL (ref 0.9–3.6)
LYMPHOCYTES NFR BLD: 32 % (ref 21–52)
MCH RBC QN AUTO: 31 PG (ref 24–34)
MCHC RBC AUTO-ENTMCNC: 33.9 G/DL (ref 31–37)
MCV RBC AUTO: 91.3 FL (ref 74–97)
MONOCYTES # BLD: 0.4 K/UL (ref 0.05–1.2)
MONOCYTES NFR BLD: 6 % (ref 3–10)
NEUTS SEG # BLD: 3.7 K/UL (ref 1.8–8)
NEUTS SEG NFR BLD: 59 % (ref 40–73)
PLATELET # BLD AUTO: 283 K/UL (ref 135–420)
PMV BLD AUTO: 9.5 FL (ref 9.2–11.8)
RBC # BLD AUTO: 4.94 M/UL (ref 4.7–5.5)
WBC # BLD AUTO: 6.2 K/UL (ref 4.6–13.2)

## 2017-11-28 PROCEDURE — 85025 COMPLETE CBC W/AUTO DIFF WBC: CPT | Performed by: PSYCHIATRY & NEUROLOGY

## 2017-11-28 PROCEDURE — 36415 COLL VENOUS BLD VENIPUNCTURE: CPT | Performed by: PSYCHIATRY & NEUROLOGY

## 2017-12-11 ENCOUNTER — OFFICE VISIT (OUTPATIENT)
Dept: FAMILY MEDICINE CLINIC | Age: 32
End: 2017-12-11

## 2017-12-11 VITALS
SYSTOLIC BLOOD PRESSURE: 119 MMHG | RESPIRATION RATE: 18 BRPM | TEMPERATURE: 96.5 F | DIASTOLIC BLOOD PRESSURE: 73 MMHG | WEIGHT: 286 LBS | BODY MASS INDEX: 36.7 KG/M2 | HEIGHT: 74 IN | OXYGEN SATURATION: 98 % | HEART RATE: 88 BPM

## 2017-12-11 DIAGNOSIS — F25.9 SCHIZOAFFECTIVE DISORDER, UNSPECIFIED TYPE (HCC): ICD-10-CM

## 2017-12-11 DIAGNOSIS — E78.2 MIXED HYPERLIPIDEMIA: Primary | ICD-10-CM

## 2017-12-11 DIAGNOSIS — Z23 ENCOUNTER FOR IMMUNIZATION: ICD-10-CM

## 2017-12-11 DIAGNOSIS — E88.81 METABOLIC SYNDROME: ICD-10-CM

## 2017-12-11 RX ORDER — LISINOPRIL 10 MG/1
10 TABLET ORAL DAILY
Qty: 90 TAB | Refills: 1 | Status: SHIPPED | OUTPATIENT
Start: 2017-12-11 | End: 2018-07-09 | Stop reason: SDUPTHER

## 2017-12-11 RX ORDER — FENOFIBRATE 48 MG/1
TABLET, COATED ORAL
Qty: 90 TAB | Refills: 1 | Status: SHIPPED | OUTPATIENT
Start: 2017-12-11 | End: 2018-07-03 | Stop reason: SDUPTHER

## 2017-12-11 RX ORDER — METFORMIN HYDROCHLORIDE 500 MG/1
500 TABLET ORAL
Qty: 90 TAB | Refills: 1 | Status: SHIPPED | OUTPATIENT
Start: 2017-12-11 | End: 2018-07-03 | Stop reason: SDUPTHER

## 2017-12-11 NOTE — PROGRESS NOTES
HISTORY OF PRESENT ILLNESS  Ramon Cavazos is a 33 y.o. male. HPI Comments: [place] is here for his 3 month checkup. He has metabolic syndrome, his last lipids were in August and looked good (except for low HDL). He still hasn't been doing any exercise, not even walking. He sees his psychiatrist regularly and they are thinking about decreasing some of his medications. He would like a flu shot today. Blood sugar problem   Pertinent negatives include no chest pain, no abdominal pain, no headaches and no shortness of breath. Cholesterol Problem   Pertinent negatives include no chest pain, no abdominal pain, no headaches and no shortness of breath. Review of Systems   Constitutional: Negative for chills and fever. HENT: Negative for congestion. Eyes: Negative for blurred vision and double vision. Respiratory: Negative for cough and shortness of breath. Cardiovascular: Negative for chest pain and palpitations. Gastrointestinal: Negative for abdominal pain, nausea and vomiting. Neurological: Negative for headaches. Physical Exam   Constitutional: He is oriented to person, place, and time. He appears well-developed and well-nourished. Weight up 9 lbs since August   Eyes: Pupils are equal, round, and reactive to light. Neck: Neck supple. Cardiovascular: Normal rate, regular rhythm and normal heart sounds. Pulmonary/Chest: Effort normal and breath sounds normal. No respiratory distress. He has no wheezes. He has no rales. Abdominal: Soft. There is no tenderness. Lymphadenopathy:     He has no cervical adenopathy. Neurological: He is alert and oriented to person, place, and time. Psychiatric: He has a normal mood and affect. Nursing note and vitals reviewed. ASSESSMENT and PLAN    ICD-10-CM ICD-9-CM    1. Mixed hyperlipidemia E78.2 272.2    2. Metabolic syndrome A51.64 540.5    3. Schizoaffective disorder, unspecified type ([facility] F25.9 295.70    4.  Encounter for immunization Z23 V03.89 INFLUENZA VIRUS VAC QUAD,SPLIT,PRESV FREE SYRINGE IM      SC IMMUNIZ ADMIN,1 SINGLE/COMB VAC/TOXOID   \  Talked about need for weight loss, increasing exercise, decreasing portion size.     AVS instructions reviewed with patient, pt verbalized understanding

## 2017-12-11 NOTE — PROGRESS NOTES
Rm 1  Pt presents to the clinic for a follow-up regarding his HTN and blood sugar issue. Flu Shot Requested: yes    Depression Screening:  PHQ over the last two weeks 12/11/2017 9/18/2017 6/19/2017 1/4/2017 8/26/2016 7/11/2016 3/30/2016   Little interest or pleasure in doing things Not at all Not at all Not at all Not at all Not at all Not at all Not at all   Feeling down, depressed or hopeless Not at all Not at all Not at all Not at all Not at all Not at all Not at all   Total Score PHQ 2 0 0 0 0 0 0 0       Learning Assessment:  Learning Assessment 6/19/2017 8/23/2016 3/30/2016   PRIMARY LEARNER Patient Patient Patient   HIGHEST LEVEL OF EDUCATION - PRIMARY LEARNER  SOME COLLEGE - [address] [facility] LEARNER NONE - [address] CAREGIVER No - No   PRIMARY LANGUAGE ENGLISH ENGLISH ENGLISH   LEARNER PREFERENCE PRIMARY DEMONSTRATION READING DEMONSTRATION   ANSWERED BY PAtient Patient patient   RELATIONSHIP SELF SELF SELF       Abuse Screening:  No flowsheet data found. Health Maintenance reviewed and discussed per provider: yes     Coordination of Care:    1. Have you been to the ER, urgent care clinic since your last visit? Hospitalized since your last visit? no    2. Have you seen or consulted any other health care providers outside of the [address] [place] [address] since your last visit? Include any pap smears or colon screening.  no

## 2017-12-11 NOTE — ASSESSMENT & PLAN NOTE
This condition is managed by Specialist.  [facility] Psychotherapeutic Meds             cloZAPine (CLOZARIL) 100 mg tablet  (Taking) 250 mg nightly. At bedtime. OLANZapine (ZYPREXA) 20 mg tablet  (Taking) Take 20 mg by mouth nightly.         Other [address] [facility]             divalproex DR (DEPAKOTE) 500 mg tablet  (Taking) TK 1 T PO BID        Lab Results   Component Value Date/Time    Sodium 142 08/07/2017 03:51 PM    Creatinine 1.10 08/07/2017 03:51 PM    TSH 1.42 08/07/2017 03:51 PM    WBC 6.2 11/28/2017 04:26 PM    ALT (SGPT) 25 08/07/2017 03:51 PM    AST (SGOT) 19 08/07/2017 03:51 PM

## 2017-12-11 NOTE — PATIENT INSTRUCTIONS
Meds refilled for 3 months. Recheck 3 months, we will get labs that day, ideally fasting. Try to increase exercise, even walking daily is good. Try to lose 1 pound per week before next visit.

## 2017-12-11 NOTE — ASSESSMENT & PLAN NOTE
Stable, based on history, physical exam and review of pertinent labs, studies and medications; meds reconciled; continue current treatment plan.

## 2017-12-11 NOTE — MR AVS SNAPSHOT
[address] [address] 
[phone] Patient: Juan Liu MRN: [id number] :1985 Visit Information Date & Time Provider Department Dept. Phone Encounter #  
 2017 10:30 AM Anisa Lopez MD [facility] [phone] [id number] Upcoming Health Maintenance Date Due DTaP/Tdap/Td series (1 - Tdap) 12/10/2007 Allergies as of 2017  Review Complete On: 2017 By: Anisa Lopez MD  
 No Known Allergies Current Immunizations  Never Reviewed Name Date Influenza Vaccine (Quad) PF  Incomplete Not reviewed this visit You Were Diagnosed With   
  
 Codes Comments Mixed hyperlipidemia    -  Primary ICD-10-CM: C67.2 ICD-9-CM: 272.2 Metabolic syndrome     VENTURA-52-QN: C35.40 ICD-9-CM: 277.7 Schizoaffective disorder, unspecified type ([facility] 75.)     ICD-10-CM: F25.9 ICD-9-CM: 295.70 Encounter for immunization     ICD-10-CM: K55 ICD-9-CM: V03.89 Vitals BP Pulse Temp Resp Height(growth percentile) Weight(growth percentile) 119/73 (BP 1 Location: Right arm, BP Patient Position: Sitting) 88 96.5 °F (35.8 °C) (Oral) 18 6' 2\" (1.88 m) 286 lb (129.7 kg) SpO2 BMI Smoking Status 98% 36.72 kg/m2 Former Smoker Vitals History BMI and BSA Data Body Mass Index Body Surface Area  
 36.72 kg/m 2 2.6 m 2 Preferred Pharmacy Pharmacy Name Phone [facility], [address] [phone] Your Updated Medication List  
  
   
This list is accurate as of: 17 11:09 AM.  Always use your most recent med list.  
  
  
  
  
 cloZAPine 100 mg tablet Commonly known as:  CLOZARIL  
250 mg nightly. At bedtime. divalproex  mg tablet Commonly known as:  DEPAKOTE TK 1 T PO BID  
  
 ergocalciferol 50,000 unit capsule Commonly known as:  ERGOCALCIFEROL TK 1 C PO WEEKLY  
  
 fenofibrate nanocrystallized 48 mg tablet Commonly known as:  [employer] TK 1 T PO HS  
  
 lisinopril 10 mg tablet Commonly known as:  Kenia Canela Take 1 Tab by mouth daily. metFORMIN 500 mg tablet Commonly known as:  GLUCOPHAGE Take 1 Tab by mouth daily (with breakfast). multivitamin tablet Commonly known as:  ONE A DAY Take 1 Tab by mouth daily. OLANZapine 20 mg tablet Commonly known as:  ZyPREXA Take 20 mg by mouth nightly. VITAMIN D3 1,000 unit tablet Generic drug:  cholecalciferol Take  by mouth daily. Prescriptions Sent to Pharmacy Refills  
 metFORMIN (GLUCOPHAGE) 500 mg tablet 1 Sig: Take 1 Tab by mouth daily (with breakfast). Class: Normal  
 Pharmacy: [facility] [address] [place] [address] Ph #: [phone] Route: Oral  
 lisinopril (PRINIVIL, ZESTRIL) 10 mg tablet 1 Sig: Take 1 Tab by mouth daily. Class: Normal  
 Pharmacy: [facility] [address] [place] [address] Ph #: [phone] Route: Oral  
 fenofibrate nanocrystallized (TRICOR) 48 mg tablet 1 Sig: TK 1 T PO HS  
 Class: Normal  
 Pharmacy: [facility] [address] [place] [address] Ph #: [phone] We Performed the Following INFLUENZA VIRUS VAC QUAD,SPLIT,PRESV FREE SYRINGE IM [id number] CPT(R)] RI IMMUNIZ ADMIN,1 SINGLE/COMB VAC/TOXOID [id number] CPT(R)] Patient Instructions Meds refilled for 3 months. Recheck 3 months, we will get labs that day, ideally fasting. Try to increase exercise, even walking daily is good. Try to lose 1 pound per week before next visit. Introducing [facility] & HEALTH SERVICES! Dear Adriel Marie: Thank you for requesting a [employer] account.   Our records indicate that you already have an active [employer] account. You can access your account anytime at [email or web address] Did you know that you can access your hospital and ER discharge instructions at any time in [employer]? You can also review all of your test results from your hospital stay or ER visit. Additional Information If you have questions, please visit the Frequently Asked Questions section of the [employer] website at [email or web address] Remember, [employer] is NOT to be used for urgent needs. For medical emergencies, dial 911. Now available from your iPhone and Android! Please provide this summary of care documentation to your next provider. Your primary care clinician is listed as Chase Cortez. If you have any questions after today's visit, please call [phone].

## 2017-12-26 ENCOUNTER — HOSPITAL ENCOUNTER (OUTPATIENT)
Dept: LAB | Age: 32
Discharge: HOME | End: 2017-12-26
Payer: SELF-PAY

## 2017-12-26 LAB
BASOPHILS # BLD: 0 K/UL (ref 0–0.06)
BASOPHILS NFR BLD: 1 % (ref 0–2)
DIFFERENTIAL METHOD BLD: NORMAL
EOSINOPHIL # BLD: 0.1 K/UL (ref 0–0.4)
EOSINOPHIL NFR BLD: 2 % (ref 0–5)
ERYTHROCYTE [DISTWIDTH] IN BLOOD BY AUTOMATED COUNT: 12.4 % (ref 11.6–14.5)
HCT VFR BLD AUTO: 43.7 % (ref 36–48)
HGB BLD-MCNC: 14.5 G/DL (ref 13–16)
LYMPHOCYTES # BLD: 2.2 K/UL (ref 0.9–3.6)
LYMPHOCYTES NFR BLD: 43 % (ref 21–52)
MCH RBC QN AUTO: 30.7 PG (ref 24–34)
MCHC RBC AUTO-ENTMCNC: 33.2 G/DL (ref 31–37)
MCV RBC AUTO: 92.6 FL (ref 74–97)
MONOCYTES # BLD: 0.4 K/UL (ref 0.05–1.2)
MONOCYTES NFR BLD: 9 % (ref 3–10)
NEUTS SEG # BLD: 2.3 K/UL (ref 1.8–8)
NEUTS SEG NFR BLD: 45 % (ref 40–73)
PLATELET # BLD AUTO: 276 K/UL (ref 135–420)
PMV BLD AUTO: 9.6 FL (ref 9.2–11.8)
RBC # BLD AUTO: 4.72 M/UL (ref 4.7–5.5)
WBC # BLD AUTO: 5 K/UL (ref 4.6–13.2)

## 2017-12-26 PROCEDURE — 85025 COMPLETE CBC W/AUTO DIFF WBC: CPT | Performed by: PSYCHIATRY & NEUROLOGY

## 2017-12-26 PROCEDURE — 36415 COLL VENOUS BLD VENIPUNCTURE: CPT | Performed by: PSYCHIATRY & NEUROLOGY

## 2018-01-23 ENCOUNTER — HOSPITAL ENCOUNTER (OUTPATIENT)
Dept: LAB | Age: 33
Discharge: HOME | End: 2018-01-23
Payer: SELF-PAY

## 2018-01-23 LAB
BASOPHILS # BLD: 0 K/UL (ref 0–0.06)
BASOPHILS NFR BLD: 1 % (ref 0–2)
DIFFERENTIAL METHOD BLD: NORMAL
EOSINOPHIL # BLD: 0.1 K/UL (ref 0–0.4)
EOSINOPHIL NFR BLD: 1 % (ref 0–5)
ERYTHROCYTE [DISTWIDTH] IN BLOOD BY AUTOMATED COUNT: 12 % (ref 11.6–14.5)
HCT VFR BLD AUTO: 43.1 % (ref 36–48)
HGB BLD-MCNC: 14.8 G/DL (ref 13–16)
LYMPHOCYTES # BLD: 1.7 K/UL (ref 0.9–3.6)
LYMPHOCYTES NFR BLD: 33 % (ref 21–52)
MCH RBC QN AUTO: 30.9 PG (ref 24–34)
MCHC RBC AUTO-ENTMCNC: 34.3 G/DL (ref 31–37)
MCV RBC AUTO: 90 FL (ref 74–97)
MONOCYTES # BLD: 0.4 K/UL (ref 0.05–1.2)
MONOCYTES NFR BLD: 7 % (ref 3–10)
NEUTS SEG # BLD: 3.1 K/UL (ref 1.8–8)
NEUTS SEG NFR BLD: 58 % (ref 40–73)
PLATELET # BLD AUTO: 265 K/UL (ref 135–420)
PMV BLD AUTO: 9.4 FL (ref 9.2–11.8)
RBC # BLD AUTO: 4.79 M/UL (ref 4.7–5.5)
WBC # BLD AUTO: 5.3 K/UL (ref 4.6–13.2)

## 2018-01-23 PROCEDURE — 36415 COLL VENOUS BLD VENIPUNCTURE: CPT | Performed by: PSYCHIATRY & NEUROLOGY

## 2018-01-23 PROCEDURE — 85025 COMPLETE CBC W/AUTO DIFF WBC: CPT | Performed by: PSYCHIATRY & NEUROLOGY

## 2018-02-20 ENCOUNTER — HOSPITAL ENCOUNTER (OUTPATIENT)
Dept: LAB | Age: 33
Discharge: HOME | End: 2018-02-20
Payer: SELF-PAY

## 2018-02-20 LAB
BASOPHILS # BLD: 0 K/UL (ref 0–0.06)
BASOPHILS NFR BLD: 0 % (ref 0–2)
DIFFERENTIAL METHOD BLD: ABNORMAL
EOSINOPHIL # BLD: 0.1 K/UL (ref 0–0.4)
EOSINOPHIL NFR BLD: 2 % (ref 0–5)
ERYTHROCYTE [DISTWIDTH] IN BLOOD BY AUTOMATED COUNT: 12 % (ref 11.6–14.5)
HCT VFR BLD AUTO: 42.5 % (ref 36–48)
HGB BLD-MCNC: 14.7 G/DL (ref 13–16)
LYMPHOCYTES # BLD: 1.8 K/UL (ref 0.9–3.6)
LYMPHOCYTES NFR BLD: 41 % (ref 21–52)
MCH RBC QN AUTO: 31.3 PG (ref 24–34)
MCHC RBC AUTO-ENTMCNC: 34.6 G/DL (ref 31–37)
MCV RBC AUTO: 90.4 FL (ref 74–97)
MONOCYTES # BLD: 0.3 K/UL (ref 0.05–1.2)
MONOCYTES NFR BLD: 6 % (ref 3–10)
NEUTS SEG # BLD: 2.4 K/UL (ref 1.8–8)
NEUTS SEG NFR BLD: 51 % (ref 40–73)
PLATELET # BLD AUTO: 262 K/UL (ref 135–420)
PMV BLD AUTO: 9.3 FL (ref 9.2–11.8)
RBC # BLD AUTO: 4.7 M/UL (ref 4.7–5.5)
WBC # BLD AUTO: 4.5 K/UL (ref 4.6–13.2)

## 2018-02-20 PROCEDURE — 85025 COMPLETE CBC W/AUTO DIFF WBC: CPT | Performed by: PSYCHIATRY & NEUROLOGY

## 2018-02-20 PROCEDURE — 36415 COLL VENOUS BLD VENIPUNCTURE: CPT | Performed by: PSYCHIATRY & NEUROLOGY

## 2018-03-12 ENCOUNTER — OFFICE VISIT (OUTPATIENT)
Dept: FAMILY MEDICINE CLINIC | Age: 33
End: 2018-03-12

## 2018-03-12 VITALS
SYSTOLIC BLOOD PRESSURE: 129 MMHG | OXYGEN SATURATION: 97 % | BODY MASS INDEX: 37.09 KG/M2 | HEART RATE: 68 BPM | WEIGHT: 289 LBS | DIASTOLIC BLOOD PRESSURE: 71 MMHG | TEMPERATURE: 95.8 F | HEIGHT: 74 IN | RESPIRATION RATE: 16 BRPM

## 2018-03-12 DIAGNOSIS — R76.11 POSITIVE PPD: ICD-10-CM

## 2018-03-12 DIAGNOSIS — Z01.84 IMMUNITY STATUS TESTING: ICD-10-CM

## 2018-03-12 DIAGNOSIS — E88.81 METABOLIC SYNDROME: ICD-10-CM

## 2018-03-12 DIAGNOSIS — Z79.899 HIGH RISK MEDICATION USE: ICD-10-CM

## 2018-03-12 DIAGNOSIS — F25.9 SCHIZOAFFECTIVE DISORDER, UNSPECIFIED TYPE (HCC): Primary | ICD-10-CM

## 2018-03-12 DIAGNOSIS — E78.2 MIXED HYPERLIPIDEMIA: ICD-10-CM

## 2018-03-12 DIAGNOSIS — E55.9 VITAMIN D DEFICIENCY: ICD-10-CM

## 2018-03-12 NOTE — PROGRESS NOTES
Rm 1  Pt presents to the clinic for a follow-up regarding his cholesterol. Pt also has a form needing filled out for [employer] and has some blood work ordered by psych. Flu Shot Requested: no    Depression Screening:  PHQ over the last two weeks 3/12/2018 12/11/2017 9/18/2017 6/19/2017 1/4/2017 8/26/2016 7/11/2016   Little interest or pleasure in doing things Not at all Not at all Not at all Not at all Not at all Not at all Not at all   Feeling down, depressed or hopeless Not at all Not at all Not at all Not at all Not at all Not at all Not at all   Total Score PHQ 2 0 0 0 0 0 0 0       Learning Assessment:  Learning Assessment 6/19/2017 8/23/2016 3/30/2016   PRIMARY LEARNER Patient Patient Patient   HIGHEST LEVEL OF EDUCATION - PRIMARY LEARNER  SOME COLLEGE - [id number] [facility] LEARNER NONE - [address] CAREGIVER No - No   PRIMARY LANGUAGE ENGLISH ENGLISH ENGLISH   LEARNER PREFERENCE PRIMARY DEMONSTRATION READING DEMONSTRATION   ANSWERED BY PAtient Patient patient   RELATIONSHIP SELF SELF SELF       Abuse Screening:  No flowsheet data found. Health Maintenance reviewed and discussed per provider: yes     Coordination of Care:    1. Have you been to the ER, urgent care clinic since your last visit? Hospitalized since your last visit? no    2. Have you seen or consulted any other health care providers outside of the [address] [place] [address] since your last visit? Include any pap smears or colon screening.  no

## 2018-03-12 NOTE — PROGRESS NOTES
HISTORY OF PRESENT ILLNESS  Neris Dupree is a 33 y.o. male. HPI Comments: Michael Beltre is here for a couple of things. His psychiatrist, Dr. Haley , has a bunch of labs that he wants done because of his schizoaffective disorder and high risk med use. He is also going to be going to [employer] and has the immunization and TB forms to fill out  He doesn't have any immunization record though. He did have a positive PPD last year, normal CXR, and took 9 months of Isoniazid for latent TB    Cholesterol Problem   Pertinent negatives include no chest pain, no abdominal pain, no headaches and no shortness of breath. Review of Systems   Constitutional: Negative for chills and fever. HENT: Negative for hearing loss and sore throat. Eyes: Negative for blurred vision and double vision. Respiratory: Negative for cough and shortness of breath. Cardiovascular: Negative for chest pain and palpitations. Gastrointestinal: Negative for abdominal pain, nausea and vomiting. Genitourinary: Negative for dysuria, frequency and urgency. Neurological: Negative for headaches. Physical Exam   Constitutional: He is oriented to person, place, and time. He appears well-developed and well-nourished. Eyes: Pupils are equal, round, and reactive to light. Neck: Neck supple. No thyromegaly present. Cardiovascular: Normal rate, regular rhythm and normal heart sounds. Pulmonary/Chest: Effort normal and breath sounds normal. No respiratory distress. He has no wheezes. He has no rales. Abdominal: Soft. He exhibits no distension. Lymphadenopathy:     He has no cervical adenopathy. Neurological: He is alert and oriented to person, place, and time. Psychiatric: He has a normal mood and affect. Nursing note and vitals reviewed.       ASSESSMENT and PLAN    ICD-10-CM ICD-9-CM    1. Schizoaffective disorder, unspecified type ([facility] 75.) F25.9 295.70 CBC WITH AUTOMATED DIFF      METABOLIC PANEL, COMPREHENSIVE      TSH 3RD GENERATION      VITAMIN D, 25 HYDROXY      T3, FREE      URINALYSIS W/MICROSCOPIC      VALPROIC ACID   2. Mixed hyperlipidemia E78.2 272.2 LIPID PANEL   3. Metabolic syndrome Z87.33 570.8 TSH 3RD GENERATION      T3, FREE      HEMOGLOBIN A1C W/O EAG      LIPID PANEL   4. Vitamin D deficiency E55.9 268.9 VITAMIN D, 25 HYDROXY   5. High risk medication use J14.055 J03.23 METABOLIC PANEL, COMPREHENSIVE      URINALYSIS W/MICROSCOPIC      VALPROIC ACID   6. Immunity status testing Z01.84 V72.61 MEASLES/MUMPS/RUBELLA IMMUNITY      HEP B SURFACE AB   7.  Positive PPD R76.11 795.51 XR CHEST SNGL V     AVS instructions reviewed with patient, pt verbalized understanding

## 2018-03-12 NOTE — MR AVS SNAPSHOT
[address] [place] [id number] 
[phone] Patient: Niharika Queen MRN: [id number] :1985 Visit Information Date & Time Provider Department Dept. Phone Encounter #  
 3/12/2018 10:30 AM Kalyan Turner MD [facility] [phone] [id number] Follow-up Instructions Return in about 3 months (around 2018). Upcoming Health Maintenance Date Due DTaP/Tdap/Td series (1 - Tdap) 12/10/2007 Allergies as of 3/12/2018  Review Complete On: 3/12/2018 By: Kalyan Turner MD  
 No Known Allergies Current Immunizations  Reviewed on 3/12/2018 Name Date Influenza Vaccine (Quad) PF 2017 Reviewed by Kalyan Turner MD on 3/12/2018 at 10:37 AM  
You Were Diagnosed With   
  
 Codes Comments Schizoaffective disorder, unspecified type ([facility]    -  Primary ICD-10-CM: F25.9 ICD-9-CM: 295.70 Mixed hyperlipidemia     ICD-10-CM: E78.2 ICD-9-CM: 272.2 Metabolic syndrome     RCF-45-GQ: I87.16 ICD-9-CM: 277.7 Vitamin D deficiency     ICD-10-CM: E55.9 ICD-9-CM: 268.9 High risk medication use     ICD-10-CM: Z79.899 ICD-9-CM: V58.69 Immunity status testing     ICD-10-CM: Z01.84 ICD-9-CM: V72.61 Positive PPD     ICD-10-CM: R76.11 
ICD-9-CM: 795.51 Vitals BP Pulse Temp Resp Height(growth percentile) Weight(growth percentile) 129/71 (BP 1 Location: Right arm, BP Patient Position: Sitting) 68 95.8 °F (35.4 °C) (Oral) 16 6' 2\" (1.88 m) 289 lb (131.1 kg) SpO2 BMI Smoking Status 97% 37.11 kg/m2 Former Smoker Vitals History BMI and BSA Data Body Mass Index Body Surface Area  
 37.11 kg/m 2 2.62 m 2 Preferred Pharmacy Pharmacy Name Phone [facility], [address] [phone] Your Updated Medication List  
  
   
 This list is accurate as of 3/12/18 10:58 AM.  Always use your most recent med list.  
  
  
  
  
 cloZAPine 100 mg tablet Commonly known as:  CLOZARIL  
250 mg nightly. At bedtime. divalproex  mg tablet Commonly known as:  DEPAKOTE TK 1 T PO BID  
  
 ergocalciferol 50,000 unit capsule Commonly known as:  ERGOCALCIFEROL TK 1 C PO WEEKLY  
  
 fenofibrate nanocrystallized 48 mg tablet Commonly known as:  [employer] TK 1 T PO HS  
  
 lisinopril 10 mg tablet Commonly known as:  Peg [employer]mer Take 1 Tab by mouth daily. metFORMIN 500 mg tablet Commonly known as:  GLUCOPHAGE Take 1 Tab by mouth daily (with breakfast). multivitamin tablet Commonly known as:  ONE A DAY Take 1 Tab by mouth daily. OLANZapine 20 mg tablet Commonly known as:  ZyPREXA Take 20 mg by mouth nightly. VITAMIN D3 1,000 unit tablet Generic drug:  cholecalciferol Take  by mouth daily. Follow-up Instructions Return in about 3 months (around 6/12/2018). To-Do List   
 03/12/2018 Lab:  MEASLES/MUMPS/RUBELLA IMMUNITY   
  
 03/12/2018 Imaging:  XR CHEST SNGL V   
  
  
Patient Instructions We will fax Dr. Ranjana Sandra lab results to his office. At some time, at your convenience, go to [facility] radiology and get the CXR I've ordered. You will need proof of a TdaP (or get another one) and the dates [place] one[place] completed the polio vaccine series (usually between 3-5 years old) Introducing [facility] & HEALTH SERVICES! Dear Chacho Mari: Thank you for requesting a [employer] account. Our records indicate that you already have an active [employer] account. You can access your account anytime at [email or web address] Did you know that you can access your hospital and ER discharge instructions at any time in [employer]? You can also review all of your test results from your hospital stay or ER visit. Additional Information If you have questions, please visit the Frequently Asked Questions section of the [employer] website at [email or web address] Remember, [employer] is NOT to be used for urgent needs. For medical emergencies, dial 911. Now available from your iPhone and Android! Please provide this summary of care documentation to your next provider. Your primary care clinician is listed as Chase Cortez. If you have any questions after today's visit, please call [phone].

## 2018-03-12 NOTE — PATIENT INSTRUCTIONS
We will fax Dr. Kyle Augustin lab results to his office. At some time, at your convenience, go to [facility] radiology and get the CXR I've ordered.   You will need proof of a TdaP (or get another one) and the dates [place] one[place] completed the polio vaccine series (usually between 3-5 years old)

## 2018-03-14 LAB
25(OH)D3+25(OH)D2 SERPL-MCNC: 28.3 NG/ML (ref 30–100)
ALBUMIN SERPL-MCNC: 4.8 G/DL (ref 3.5–5.5)
ALBUMIN/GLOB SERPL: 2 {RATIO} (ref 1.2–2.2)
ALP SERPL-CCNC: 45 IU/L (ref 39–117)
ALT SERPL-CCNC: 27 IU/L (ref 0–44)
APPEARANCE UR: CLEAR
AST SERPL-CCNC: 24 IU/L (ref 0–40)
BACTERIA #/AREA URNS HPF: ABNORMAL /[HPF]
BASOPHILS # BLD AUTO: 0 X10E3/UL (ref 0–0.2)
BASOPHILS NFR BLD AUTO: 1 %
BILIRUB SERPL-MCNC: 0.4 MG/DL (ref 0–1.2)
BILIRUB UR QL STRIP: NEGATIVE
BUN SERPL-MCNC: 17 MG/DL (ref 6–20)
BUN/CREAT SERPL: 16 (ref 9–20)
CALCIUM SERPL-MCNC: 9.8 MG/DL (ref 8.7–10.2)
CASTS URNS MICRO: ABNORMAL
CASTS URNS QL MICRO: PRESENT /LPF
CHLORIDE SERPL-SCNC: 101 MMOL/L (ref 96–106)
CHOLEST SERPL-MCNC: 115 MG/DL (ref 100–199)
CO2 SERPL-SCNC: 23 MMOL/L (ref 18–29)
COLOR UR: YELLOW
CREAT SERPL-MCNC: 1.06 MG/DL (ref 0.76–1.27)
EOSINOPHIL # BLD AUTO: 0.1 X10E3/UL (ref 0–0.4)
EOSINOPHIL NFR BLD AUTO: 2 %
EPI CELLS #/AREA URNS HPF: ABNORMAL /HPF
ERYTHROCYTE [DISTWIDTH] IN BLOOD BY AUTOMATED COUNT: 13.2 % (ref 12.3–15.4)
GFR SERPLBLD CREATININE-BSD FMLA CKD-EPI: 108 ML/MIN/1.73
GFR SERPLBLD CREATININE-BSD FMLA CKD-EPI: 93 ML/MIN/1.73
GLOBULIN SER CALC-MCNC: 2.4 G/DL (ref 1.5–4.5)
GLUCOSE SERPL-MCNC: 89 MG/DL (ref 65–99)
GLUCOSE UR QL: NEGATIVE
HBA1C MFR BLD: 5.2 % (ref 4.8–5.6)
HBV SURFACE AB SER QL: REACTIVE
HCT VFR BLD AUTO: 44.5 % (ref 37.5–51)
HDLC SERPL-MCNC: 27 MG/DL
HGB BLD-MCNC: 15.2 G/DL (ref 13–17.7)
HGB UR QL STRIP: NEGATIVE
IMM GRANULOCYTES # BLD: 0 X10E3/UL (ref 0–0.1)
IMM GRANULOCYTES NFR BLD: 0 %
KETONES UR QL STRIP: NEGATIVE
LDLC SERPL CALC-MCNC: 53 MG/DL (ref 0–99)
LEUKOCYTE ESTERASE UR QL STRIP: ABNORMAL
LYMPHOCYTES # BLD AUTO: 2.9 X10E3/UL (ref 0.7–3.1)
LYMPHOCYTES NFR BLD AUTO: 46 %
MCH RBC QN AUTO: 31.3 PG (ref 26.6–33)
MCHC RBC AUTO-ENTMCNC: 34.2 G/DL (ref 31.5–35.7)
MCV RBC AUTO: 92 FL (ref 79–97)
MEV IGG SER IA-ACNC: 88.9 AU/ML
MICRO URNS: ABNORMAL
MONOCYTES # BLD AUTO: 0.4 X10E3/UL (ref 0.1–0.9)
MONOCYTES NFR BLD AUTO: 7 %
MUCOUS THREADS URNS QL MICRO: PRESENT
MUV IGG SER IA-ACNC: 68.8 AU/ML
NEUTROPHILS # BLD AUTO: 2.7 X10E3/UL (ref 1.4–7)
NEUTROPHILS NFR BLD AUTO: 44 %
NITRITE UR QL STRIP: NEGATIVE
PH UR STRIP: 5.5 [PH] (ref 5–7.5)
PLATELET # BLD AUTO: 295 X10E3/UL (ref 150–379)
POTASSIUM SERPL-SCNC: 4.6 MMOL/L (ref 3.5–5.2)
PROT SERPL-MCNC: 7.2 G/DL (ref 6–8.5)
PROT UR QL STRIP: NEGATIVE
RBC # BLD AUTO: 4.86 X10E6/UL (ref 4.14–5.8)
RBC #/AREA URNS HPF: ABNORMAL /HPF
RUBV IGG SERPL IA-ACNC: 2.02 INDEX
SODIUM SERPL-SCNC: 142 MMOL/L (ref 134–144)
SP GR UR: 1.02 (ref 1–1.03)
T3FREE SERPL-MCNC: 4.7 PG/ML (ref 2–4.4)
TRIGL SERPL-MCNC: 175 MG/DL (ref 0–149)
TSH SERPL DL<=0.005 MIU/L-ACNC: 2.94 UIU/ML (ref 0.45–4.5)
UROBILINOGEN UR STRIP-MCNC: 0.2 MG/DL (ref 0.2–1)
VALPROATE SERPL-MCNC: 63 UG/ML (ref 50–100)
VLDLC SERPL CALC-MCNC: 35 MG/DL (ref 5–40)
WBC # BLD AUTO: 6.2 X10E3/UL (ref 3.4–10.8)
WBC #/AREA URNS HPF: ABNORMAL /HPF

## 2018-03-14 NOTE — PROGRESS NOTES
Advise Martha Valle that his labs are back, they all look fine. Would they like me to fax the results to Dr. Romana Chance? He is immune to MMR and Hep B, the only thing he'll need for [employer] is a TdaP. By the way, we did get the record from Nadine Thomas.

## 2018-03-20 ENCOUNTER — HOSPITAL ENCOUNTER (OUTPATIENT)
Dept: LAB | Age: 33
Discharge: HOME | End: 2018-03-20
Payer: MEDICAID

## 2018-03-20 ENCOUNTER — HOSPITAL ENCOUNTER (OUTPATIENT)
Dept: GENERAL RADIOLOGY | Age: 33
Discharge: HOME | End: 2018-03-20
Payer: MEDICAID

## 2018-03-20 DIAGNOSIS — R76.11 POSITIVE PPD: ICD-10-CM

## 2018-03-20 LAB
BASOPHILS # BLD: 0 K/UL (ref 0–0.06)
BASOPHILS NFR BLD: 1 % (ref 0–2)
DIFFERENTIAL METHOD BLD: NORMAL
EOSINOPHIL # BLD: 0.1 K/UL (ref 0–0.4)
EOSINOPHIL NFR BLD: 1 % (ref 0–5)
ERYTHROCYTE [DISTWIDTH] IN BLOOD BY AUTOMATED COUNT: 12.1 % (ref 11.6–14.5)
HCT VFR BLD AUTO: 43.6 % (ref 36–48)
HGB BLD-MCNC: 15 G/DL (ref 13–16)
LYMPHOCYTES # BLD: 2.3 K/UL (ref 0.9–3.6)
LYMPHOCYTES NFR BLD: 39 % (ref 21–52)
MCH RBC QN AUTO: 31.1 PG (ref 24–34)
MCHC RBC AUTO-ENTMCNC: 34.4 G/DL (ref 31–37)
MCV RBC AUTO: 90.5 FL (ref 74–97)
MONOCYTES # BLD: 0.4 K/UL (ref 0.05–1.2)
MONOCYTES NFR BLD: 7 % (ref 3–10)
NEUTS SEG # BLD: 3.2 K/UL (ref 1.8–8)
NEUTS SEG NFR BLD: 52 % (ref 40–73)
PLATELET # BLD AUTO: 261 K/UL (ref 135–420)
PMV BLD AUTO: 9.3 FL (ref 9.2–11.8)
RBC # BLD AUTO: 4.82 M/UL (ref 4.7–5.5)
WBC # BLD AUTO: 6 K/UL (ref 4.6–13.2)

## 2018-03-20 PROCEDURE — 85025 COMPLETE CBC W/AUTO DIFF WBC: CPT | Performed by: PSYCHIATRY & NEUROLOGY

## 2018-03-20 PROCEDURE — 71045 X-RAY EXAM CHEST 1 VIEW: CPT

## 2018-03-20 PROCEDURE — 36415 COLL VENOUS BLD VENIPUNCTURE: CPT | Performed by: PSYCHIATRY & NEUROLOGY

## 2018-03-22 ENCOUNTER — CLINICAL SUPPORT (OUTPATIENT)
Dept: FAMILY MEDICINE CLINIC | Age: 33
End: 2018-03-22

## 2018-03-22 ENCOUNTER — DOCUMENTATION ONLY (OUTPATIENT)
Dept: FAMILY MEDICINE CLINIC | Age: 33
End: 2018-03-22

## 2018-03-22 DIAGNOSIS — Z23 NEED FOR TDAP VACCINATION: Primary | ICD-10-CM

## 2018-03-22 NOTE — PROGRESS NOTES
Presented for Tdap Vaccine. Administered in left deltoid without complaints. Pt observed for 5 min. No adverse reaction noted.  Pt left office in stable condition

## 2018-04-16 ENCOUNTER — HOSPITAL ENCOUNTER (OUTPATIENT)
Dept: LAB | Age: 33
Discharge: HOME | End: 2018-04-16
Payer: MEDICAID

## 2018-04-16 LAB
BASOPHILS # BLD: 0 K/UL (ref 0–0.06)
BASOPHILS NFR BLD: 0 % (ref 0–2)
DIFFERENTIAL METHOD BLD: NORMAL
EOSINOPHIL # BLD: 0 K/UL (ref 0–0.4)
EOSINOPHIL NFR BLD: 1 % (ref 0–5)
ERYTHROCYTE [DISTWIDTH] IN BLOOD BY AUTOMATED COUNT: 12.1 % (ref 11.6–14.5)
HCT VFR BLD AUTO: 43.4 % (ref 36–48)
HGB BLD-MCNC: 14.7 G/DL (ref 13–16)
LYMPHOCYTES # BLD: 1.9 K/UL (ref 0.9–3.6)
LYMPHOCYTES NFR BLD: 38 % (ref 21–52)
MCH RBC QN AUTO: 31.1 PG (ref 24–34)
MCHC RBC AUTO-ENTMCNC: 33.9 G/DL (ref 31–37)
MCV RBC AUTO: 91.8 FL (ref 74–97)
MONOCYTES # BLD: 0.4 K/UL (ref 0.05–1.2)
MONOCYTES NFR BLD: 7 % (ref 3–10)
NEUTS SEG # BLD: 2.7 K/UL (ref 1.8–8)
NEUTS SEG NFR BLD: 54 % (ref 40–73)
PLATELET # BLD AUTO: 272 K/UL (ref 135–420)
PMV BLD AUTO: 9.4 FL (ref 9.2–11.8)
RBC # BLD AUTO: 4.73 M/UL (ref 4.7–5.5)
WBC # BLD AUTO: 5 K/UL (ref 4.6–13.2)

## 2018-04-16 PROCEDURE — 36415 COLL VENOUS BLD VENIPUNCTURE: CPT | Performed by: PSYCHIATRY & NEUROLOGY

## 2018-04-16 PROCEDURE — 85025 COMPLETE CBC W/AUTO DIFF WBC: CPT | Performed by: PSYCHIATRY & NEUROLOGY

## 2018-05-14 ENCOUNTER — HOSPITAL ENCOUNTER (OUTPATIENT)
Dept: LAB | Age: 33
Discharge: HOME | End: 2018-05-14
Payer: MEDICAID

## 2018-05-14 LAB
25(OH)D3 SERPL-MCNC: 37.2 NG/ML (ref 30–100)
ALBUMIN SERPL-MCNC: 4.2 G/DL (ref 3.4–5)
ALBUMIN/GLOB SERPL: 1.2 {RATIO} (ref 0.8–1.7)
ALP SERPL-CCNC: 51 U/L (ref 45–117)
ALT SERPL-CCNC: 38 U/L (ref 16–61)
ANION GAP SERPL CALC-SCNC: 8 MMOL/L (ref 3–18)
APPEARANCE UR: CLEAR
AST SERPL-CCNC: 21 U/L (ref 15–37)
BACTERIA URNS QL MICRO: NEGATIVE /HPF
BASOPHILS # BLD: 0 K/UL (ref 0–0.06)
BASOPHILS NFR BLD: 1 % (ref 0–2)
BILIRUB SERPL-MCNC: 0.5 MG/DL (ref 0.2–1)
BILIRUB UR QL: NEGATIVE
BUN SERPL-MCNC: 19 MG/DL (ref 7–18)
BUN/CREAT SERPL: 15 (ref 12–20)
CALCIUM SERPL-MCNC: 9.1 MG/DL (ref 8.5–10.1)
CHLORIDE SERPL-SCNC: 106 MMOL/L (ref 100–108)
CHOLEST SERPL-MCNC: 128 MG/DL
CO2 SERPL-SCNC: 28 MMOL/L (ref 21–32)
COLOR UR: YELLOW
CREAT SERPL-MCNC: 1.25 MG/DL (ref 0.6–1.3)
DIFFERENTIAL METHOD BLD: NORMAL
EOSINOPHIL # BLD: 0 K/UL (ref 0–0.4)
EOSINOPHIL NFR BLD: 1 % (ref 0–5)
EPITH CASTS URNS QL MICRO: NORMAL /LPF (ref 0–5)
ERYTHROCYTE [DISTWIDTH] IN BLOOD BY AUTOMATED COUNT: 12.2 % (ref 11.6–14.5)
GLOBULIN SER CALC-MCNC: 3.4 G/DL (ref 2–4)
GLUCOSE SERPL-MCNC: 89 MG/DL (ref 74–99)
GLUCOSE UR STRIP.AUTO-MCNC: NEGATIVE MG/DL
HBA1C MFR BLD: 5.3 % (ref 4.2–5.6)
HCT VFR BLD AUTO: 44.3 % (ref 36–48)
HDLC SERPL-MCNC: 22 MG/DL (ref 40–60)
HDLC SERPL: 5.8 {RATIO} (ref 0–5)
HGB BLD-MCNC: 14.9 G/DL (ref 13–16)
HGB UR QL STRIP: NEGATIVE
KETONES UR QL STRIP.AUTO: ABNORMAL MG/DL
LDLC SERPL CALC-MCNC: 59.8 MG/DL (ref 0–100)
LEUKOCYTE ESTERASE UR QL STRIP.AUTO: ABNORMAL
LIPID PROFILE,FLP: ABNORMAL
LYMPHOCYTES # BLD: 1.9 K/UL (ref 0.9–3.6)
LYMPHOCYTES NFR BLD: 32 % (ref 21–52)
MCH RBC QN AUTO: 30.9 PG (ref 24–34)
MCHC RBC AUTO-ENTMCNC: 33.6 G/DL (ref 31–37)
MCV RBC AUTO: 91.9 FL (ref 74–97)
MONOCYTES # BLD: 0.4 K/UL (ref 0.05–1.2)
MONOCYTES NFR BLD: 6 % (ref 3–10)
NEUTS SEG # BLD: 3.6 K/UL (ref 1.8–8)
NEUTS SEG NFR BLD: 60 % (ref 40–73)
NITRITE UR QL STRIP.AUTO: NEGATIVE
PH UR STRIP: 8 [PH] (ref 5–8)
PLATELET # BLD AUTO: 294 K/UL (ref 135–420)
PMV BLD AUTO: 9.3 FL (ref 9.2–11.8)
POTASSIUM SERPL-SCNC: 4.6 MMOL/L (ref 3.5–5.5)
PROT SERPL-MCNC: 7.6 G/DL (ref 6.4–8.2)
PROT UR STRIP-MCNC: NEGATIVE MG/DL
RBC # BLD AUTO: 4.82 M/UL (ref 4.7–5.5)
RBC #/AREA URNS HPF: 0 /HPF (ref 0–5)
SODIUM SERPL-SCNC: 142 MMOL/L (ref 136–145)
SP GR UR REFRACTOMETRY: 1.02 (ref 1–1.03)
T3FREE SERPL-MCNC: 3.5 PG/ML (ref 2.18–3.98)
TRIGL SERPL-MCNC: 231 MG/DL (ref ?–150)
TSH SERPL DL<=0.05 MIU/L-ACNC: 2.66 UIU/ML (ref 0.36–3.74)
UROBILINOGEN UR QL STRIP.AUTO: 1 EU/DL (ref 0.2–1)
VALPROATE SERPL-MCNC: 47 UG/ML (ref 50–100)
VLDLC SERPL CALC-MCNC: 46.2 MG/DL
WBC # BLD AUTO: 6 K/UL (ref 4.6–13.2)
WBC URNS QL MICRO: NORMAL /HPF (ref 0–4)

## 2018-05-14 PROCEDURE — 82306 VITAMIN D 25 HYDROXY: CPT | Performed by: PSYCHIATRY & NEUROLOGY

## 2018-05-14 PROCEDURE — 81001 URINALYSIS AUTO W/SCOPE: CPT | Performed by: PSYCHIATRY & NEUROLOGY

## 2018-05-14 PROCEDURE — 80164 ASSAY DIPROPYLACETIC ACD TOT: CPT | Performed by: PSYCHIATRY & NEUROLOGY

## 2018-05-14 PROCEDURE — 83036 HEMOGLOBIN GLYCOSYLATED A1C: CPT | Performed by: PSYCHIATRY & NEUROLOGY

## 2018-05-14 PROCEDURE — 80053 COMPREHEN METABOLIC PANEL: CPT | Performed by: PSYCHIATRY & NEUROLOGY

## 2018-05-14 PROCEDURE — 36415 COLL VENOUS BLD VENIPUNCTURE: CPT | Performed by: PSYCHIATRY & NEUROLOGY

## 2018-05-14 PROCEDURE — 85025 COMPLETE CBC W/AUTO DIFF WBC: CPT | Performed by: PSYCHIATRY & NEUROLOGY

## 2018-05-14 PROCEDURE — 80061 LIPID PANEL: CPT | Performed by: PSYCHIATRY & NEUROLOGY

## 2018-05-14 PROCEDURE — 84481 FREE ASSAY (FT-3): CPT | Performed by: PSYCHIATRY & NEUROLOGY

## 2018-05-14 PROCEDURE — 84443 ASSAY THYROID STIM HORMONE: CPT | Performed by: PSYCHIATRY & NEUROLOGY

## 2018-06-11 ENCOUNTER — OFFICE VISIT (OUTPATIENT)
Dept: FAMILY MEDICINE CLINIC | Age: 33
End: 2018-06-11

## 2018-06-11 VITALS
DIASTOLIC BLOOD PRESSURE: 80 MMHG | WEIGHT: 289 LBS | BODY MASS INDEX: 37.09 KG/M2 | SYSTOLIC BLOOD PRESSURE: 117 MMHG | HEART RATE: 70 BPM | OXYGEN SATURATION: 97 % | RESPIRATION RATE: 16 BRPM | TEMPERATURE: 97.6 F | HEIGHT: 74 IN

## 2018-06-11 DIAGNOSIS — E66.01 SEVERE OBESITY (BMI 35.0-39.9): ICD-10-CM

## 2018-06-11 DIAGNOSIS — F25.9 SCHIZOAFFECTIVE DISORDER, UNSPECIFIED TYPE (HCC): Primary | ICD-10-CM

## 2018-06-11 DIAGNOSIS — E88.81 METABOLIC SYNDROME: ICD-10-CM

## 2018-06-11 NOTE — ASSESSMENT & PLAN NOTE
This condition is managed by Specialist.  Key Psych[facility] Meds             OLANZapine (ZYPREXA) 20 mg tablet  (Taking) Take 20 mg by mouth nightly.         Other [address] [facility]             divalproex DR (DEPAKOTE) 500 mg tablet  (Taking) TK 1 T PO BID        Lab Results   Component Value Date/Time    Sodium 142 05/14/2018 02:29 PM    Creatinine 1.25 05/14/2018 02:29 PM    TSH 2.66 05/14/2018 02:29 PM    WBC 6.0 05/14/2018 02:29 PM    ALT (SGPT) 38 05/14/2018 02:29 PM    AST (SGOT) 21 05/14/2018 02:29 PM

## 2018-06-11 NOTE — MR AVS SNAPSHOT
[address] [address] 
[phone] Patient: Valentino Meier MRN: [id number] :1985 Visit Information Date & Time Provider Department Dept. Phone Encounter #  
 2018 10:30 AM Lory Madrid MD [employer] [phone] [id number] Follow-up Instructions Return if symptoms worsen or fail to improve. Upcoming Health Maintenance Date Due Influenza Age 5 to Adult 2018 DTaP/Tdap/Td series (2 - Td) 3/22/2028 Allergies as of 2018  Review Complete On: 2018 By: Lory Madrid MD  
 No Known Allergies Current Immunizations  Reviewed on 3/12/2018 Name Date Influenza Vaccine (Quad) PF 2017 Tdap 3/22/2018 Not reviewed this visit You Were Diagnosed With   
  
 Codes Comments Schizoaffective disorder, unspecified type ([facility] 75.)    -  Primary ICD-10-CM: F25.9 ICD-9-CM: 295.70 Metabolic syndrome     WLS-82-BZ: D98.05 ICD-9-CM: 277.7 Severe obesity (BMI 35.0-39.9) (HCC)     ICD-10-CM: E66.01 
ICD-9-CM: 278.01 Vitals BP Pulse Temp Resp Height(growth percentile) Weight(growth percentile) 117/80 (BP 1 Location: Left arm, BP Patient Position: Sitting) 70 97.6 °F (36.4 °C) (Oral) 16 6' 2\" (1.88 m) 289 lb (131.1 kg) SpO2 BMI Smoking Status 97% 37.11 kg/m2 Former Smoker Vitals History BMI and BSA Data Body Mass Index Body Surface Area  
 37.11 kg/m 2 2.62 m 2 Preferred Pharmacy Pharmacy Name Phone [facility], [address] [facility] 11 [facility] [phone] Your Updated Medication List  
  
   
This list is accurate as of 18 10:38 AM.  Always use your most recent med list.  
  
  
  
  
 cloZAPine 100 mg tablet Commonly known as:  CLOZARIL  
250 mg nightly. At bedtime. divalproex  mg tablet Commonly known as:  DEPAKOTE TK 1 T PO BID  
  
 ergocalciferol 50,000 unit capsule Commonly known as:  ERGOCALCIFEROL TK 1 C PO WEEKLY  
  
 fenofibrate nanocrystallized 48 mg tablet Commonly known as:  [employer] TK 1 T PO HS  
  
 lisinopril 10 mg tablet Commonly known as:  Fabiola Husbands Take 1 Tab by mouth daily. metFORMIN 500 mg tablet Commonly known as:  GLUCOPHAGE Take 1 Tab by mouth daily (with breakfast). multivitamin tablet Commonly known as:  ONE A DAY Take 1 Tab by mouth daily. OLANZapine 20 mg tablet Commonly known as:  ZyPREXA Take 20 mg by mouth nightly. VITAMIN D3 1,000 unit tablet Generic drug:  cholecalciferol Take  by mouth daily. Follow-up Instructions Return if symptoms worsen or fail to improve. Patient Instructions   
continue everything the same. Continue efforts to lose weight, increase exercise. Recheck 4-6 months. Introducing [facility] & HEALTH SERVICES! Dear Lindsay Amaro: Thank you for requesting a [employer] account. Our records indicate that you already have an active [employer] account. You can access your account anytime at [email or web address] Did you know that you can access your hospital and ER discharge instructions at any time in [employer]? You can also review all of your test results from your hospital stay or ER visit. Additional Information If you have questions, please visit the Frequently Asked Questions section of the [employer] website at [email or web address] Remember, [employer] is NOT to be used for urgent needs. For medical emergencies, dial 911. Now available from your iPhone and Android! Please provide this summary of care documentation to your next provider. Your primary care clinician is listed as Chase Cortez. If you have any questions after today's visit, please call [phone].

## 2018-06-11 NOTE — PROGRESS NOTES
Rm 2  Pt presents to the clinic for a follow-up regarding his cholesterol. Depression Screening:  PHQ over the last two weeks 3/12/2018 12/11/2017 9/18/2017 6/19/2017 1/4/2017 8/26/2016 7/11/2016   Little interest or pleasure in doing things Not at all Not at all Not at all Not at all Not at all Not at all Not at all   Feeling down, depressed or hopeless Not at all Not at all Not at all Not at all Not at all Not at all Not at all   Total Score PHQ 2 0 0 0 0 0 0 0       Learning Assessment:  Learning Assessment 6/19/2017 8/23/2016 3/30/2016   PRIMARY LEARNER Patient Patient Patient   HIGHEST LEVEL OF EDUCATION - PRIMARY LEARNER  SOME COLLEGE - [address] [employer] LEARNER NONE - [address] CAREGIVER No - No   PRIMARY LANGUAGE ENGLISH ENGLISH ENGLISH   LEARNER PREFERENCE PRIMARY DEMONSTRATION READING DEMONSTRATION   ANSWERED BY PAtient Patient patient   RELATIONSHIP SELF SELF SELF       Abuse Screening:  No flowsheet data found. Health Maintenance reviewed and discussed per provider: yes     Coordination of Care:    1. Have you been to the ER, urgent care clinic since your last visit? Hospitalized since your last visit? no    2. Have you seen or consulted any other health care providers outside of the [address] [place] [address] since your last visit? Include any pap smears or colon screening.  no

## 2018-06-11 NOTE — PATIENT INSTRUCTIONS
continue everything the same. Continue efforts to lose weight, increase exercise. Recheck 4-6 months.

## 2018-06-11 NOTE — PROGRESS NOTES
HISTORY OF PRESENT ILLNESS  Hugo Leonard is a 33 y.o. male. HPI Comments: Rigo Johnson is here for follow up. He mentions that Dr. Lori Gonzalez took him off of the Clozaril, he's doing fine without it mentally, and has lost 5 lbs since stopping it. She also did a bunch of labs on 5/14 and everything is ok. He has no complaints at this time    Cholesterol Problem   Pertinent negatives include no chest pain, no abdominal pain, no headaches and no shortness of breath. Review of Systems   Constitutional: Negative for chills and fever. Eyes: Negative for blurred vision and double vision. Respiratory: Negative for cough and shortness of breath. Cardiovascular: Negative for chest pain and palpitations. Gastrointestinal: Negative for abdominal pain, nausea and vomiting. Genitourinary: Negative for dysuria and urgency. Neurological: Negative for headaches. Physical Exam   Constitutional: He is oriented to person, place, and time. He appears well-developed and well-nourished. Eyes: Pupils are equal, round, and reactive to light. Neck: Neck supple. No thyromegaly present. Cardiovascular: Normal rate, regular rhythm and normal heart sounds. Pulmonary/Chest: Effort normal and breath sounds normal. No respiratory distress. He has no wheezes. He has no rales. Abdominal: Soft. He exhibits no distension. Lymphadenopathy:     He has no cervical adenopathy. Neurological: He is alert and oriented to person, place, and time. Nursing note and vitals reviewed. ASSESSMENT and PLAN    ICD-10-CM ICD-9-CM    1. Schizoaffective disorder, unspecified type ([facility] F25.9 295.70    2. Metabolic syndrome K43.26 594.6    3.  Severe obesity (BMI 35.0-39.9) ([facility]) E66.01 278.01

## 2018-07-03 RX ORDER — METFORMIN HYDROCHLORIDE 500 MG/1
TABLET ORAL
Qty: 90 TAB | Refills: 0 | Status: SHIPPED | OUTPATIENT
Start: 2018-07-03 | End: 2018-09-26 | Stop reason: SDUPTHER

## 2018-07-03 RX ORDER — FENOFIBRATE 48 MG/1
TABLET, COATED ORAL
Qty: 90 TAB | Refills: 0 | Status: SHIPPED | OUTPATIENT
Start: 2018-07-03 | End: 2018-09-26 | Stop reason: SDUPTHER

## 2018-07-09 RX ORDER — LISINOPRIL 10 MG/1
TABLET ORAL
Qty: 90 TAB | Refills: 0 | Status: SHIPPED | OUTPATIENT
Start: 2018-07-09 | End: 2018-09-26 | Stop reason: SDUPTHER

## 2018-09-05 ENCOUNTER — OFFICE VISIT (OUTPATIENT)
Dept: FAMILY MEDICINE CLINIC | Age: 33
End: 2018-09-05

## 2018-09-05 VITALS
HEIGHT: 74 IN | SYSTOLIC BLOOD PRESSURE: 144 MMHG | OXYGEN SATURATION: 97 % | WEIGHT: 285 LBS | BODY MASS INDEX: 36.57 KG/M2 | TEMPERATURE: 98.2 F | HEART RATE: 106 BPM | RESPIRATION RATE: 16 BRPM | DIASTOLIC BLOOD PRESSURE: 74 MMHG

## 2018-09-05 DIAGNOSIS — Z02.5 SPORTS PHYSICAL: Primary | ICD-10-CM

## 2018-09-05 NOTE — PROGRESS NOTES
Rm 1 
Patient presents to the clinic for a sports physical. 
 
Depression Screening: PHQ over the last two weeks 9/5/2018 3/12/2018 12/11/2017 9/18/2017 6/19/2017 1/4/2017 8/26/2016 Little interest or pleasure in doing things Not at all Not at all Not at all Not at all Not at all Not at all Not at all Feeling down, depressed, irritable, or hopeless Not at all Not at all Not at all Not at all Not at all Not at all Not at all Total Score PHQ 2 0 0 0 0 0 0 0 Learning Assessment: 
Learning Assessment 6/19/2017 8/23/2016 3/30/2016 PRIMARY LEARNER Patient Patient Patient HIGHEST LEVEL OF EDUCATION - PRIMARY LEARNER  SOME COLLEGE - 4 YEARS OF COLLEGE  
BARRIERS PRIMARY LEARNER NONE - NONE  
CO-LEARNER CAREGIVER No - No  
PRIMARY LANGUAGE ENGLISH ENGLISH ENGLISH  
LEARNER PREFERENCE PRIMARY DEMONSTRATION READING DEMONSTRATION  
ANSWERED BY PAtient Patient patient RELATIONSHIP SELF SELF SELF Abuse Screening: No flowsheet data found. Health Maintenance reviewed and discussed per provider: yes Coordination of Care: 1. Have you been to the ER, urgent care clinic since your last visit? Hospitalized since your last visit? no 
 
2. Have you seen or consulted any other health care providers outside of the [address] [place] [address] since your last visit? Include any pap smears or colon screening.  no

## 2018-09-05 NOTE — PROGRESS NOTES
HISTORY OF PRESENT ILLNESS Yo Cancino is a 33 y.o. male. HPI Comments: Uma Westfall is here for a sports physical to play [employer] at [employer]. Nothing new since I saw him recently Physical  
Pertinent negatives include no chest pain, no abdominal pain, no headaches and no shortness of breath. Review of Systems Constitutional: Negative for chills and fever. HENT: Negative for sore throat. Eyes: Negative for blurred vision and double vision. Respiratory: Negative for cough and shortness of breath. Cardiovascular: Negative for chest pain and palpitations. Gastrointestinal: Negative for abdominal pain, heartburn, nausea and vomiting. Genitourinary: Negative for dysuria and urgency. Musculoskeletal: Negative for myalgias and neck pain. Neurological: Negative for headaches. Physical Exam  
Constitutional: He appears well-developed and well-nourished. HENT:  
Right Ear: Tympanic membrane, external ear and ear canal normal.  
Left Ear: Tympanic membrane, external ear and ear canal normal.  
Nose: Nose normal.  
Mouth/Throat: Uvula is midline, oropharynx is clear and moist and mucous membranes are normal. No posterior oropharyngeal erythema. Eyes: Conjunctivae are normal. Pupils are equal, round, and reactive to light. Right conjunctiva is not injected. Left conjunctiva is not injected. Neck: Neck supple. No thyromegaly present. Cardiovascular: Normal rate and regular rhythm. Pulmonary/Chest: Effort normal and breath sounds normal. No respiratory distress. He has no wheezes. He has no rales. Abdominal: He exhibits no distension. There is no tenderness. Lymphadenopathy:  
  He has no cervical adenopathy. Skin: No rash noted. Nursing note and vitals reviewed. ASSESSMENT and PLAN 
  ICD-10-CM ICD-9-CM 1. Sports physical Z02.5 V70.3 OK for sports

## 2018-09-26 RX ORDER — METFORMIN HYDROCHLORIDE 500 MG/1
TABLET ORAL
Qty: 90 TAB | Refills: 0 | Status: SHIPPED | OUTPATIENT
Start: 2018-09-26 | End: 2019-02-08 | Stop reason: SDUPTHER

## 2018-09-26 RX ORDER — LISINOPRIL 10 MG/1
TABLET ORAL
Qty: 90 TAB | Refills: 0 | Status: SHIPPED | OUTPATIENT
Start: 2018-09-26 | End: 2019-02-08 | Stop reason: SDUPTHER

## 2018-09-26 RX ORDER — FENOFIBRATE 48 MG/1
TABLET, COATED ORAL
Qty: 90 TAB | Refills: 0 | Status: SHIPPED | OUTPATIENT
Start: 2018-09-26 | End: 2019-02-08 | Stop reason: SDUPTHER

## 2018-10-17 ENCOUNTER — LAB ONLY (OUTPATIENT)
Dept: FAMILY MEDICINE CLINIC | Age: 33
End: 2018-10-17

## 2018-10-17 DIAGNOSIS — Z23 ENCOUNTER FOR IMMUNIZATION: Primary | ICD-10-CM

## 2018-12-19 ENCOUNTER — HOSPITAL ENCOUNTER (OUTPATIENT)
Dept: LAB | Age: 33
Discharge: HOME | End: 2018-12-19
Payer: MEDICAID

## 2018-12-19 ENCOUNTER — OFFICE VISIT (OUTPATIENT)
Dept: FAMILY MEDICINE CLINIC | Age: 33
End: 2018-12-19

## 2018-12-19 VITALS
HEART RATE: 86 BPM | WEIGHT: 256 LBS | BODY MASS INDEX: 32.85 KG/M2 | DIASTOLIC BLOOD PRESSURE: 88 MMHG | RESPIRATION RATE: 18 BRPM | HEIGHT: 74 IN | TEMPERATURE: 97.5 F | OXYGEN SATURATION: 97 % | SYSTOLIC BLOOD PRESSURE: 121 MMHG

## 2018-12-19 DIAGNOSIS — E88.81 METABOLIC SYNDROME: ICD-10-CM

## 2018-12-19 DIAGNOSIS — E88.81 METABOLIC SYNDROME: Primary | ICD-10-CM

## 2018-12-19 DIAGNOSIS — E66.9 OBESITY (BMI 30.0-34.9): ICD-10-CM

## 2018-12-19 LAB
ALBUMIN SERPL-MCNC: 4.5 G/DL (ref 3.4–5)
ALBUMIN/GLOB SERPL: 1.5 {RATIO} (ref 0.8–1.7)
ALP SERPL-CCNC: 53 U/L (ref 45–117)
ALT SERPL-CCNC: 47 U/L (ref 16–61)
ANION GAP SERPL CALC-SCNC: 7 MMOL/L (ref 3–18)
AST SERPL-CCNC: 20 U/L (ref 15–37)
BILIRUB SERPL-MCNC: 0.7 MG/DL (ref 0.2–1)
BUN SERPL-MCNC: 15 MG/DL (ref 7–18)
BUN/CREAT SERPL: 13 (ref 12–20)
CALCIUM SERPL-MCNC: 9.7 MG/DL (ref 8.5–10.1)
CHLORIDE SERPL-SCNC: 105 MMOL/L (ref 100–108)
CHOLEST SERPL-MCNC: 101 MG/DL
CO2 SERPL-SCNC: 27 MMOL/L (ref 21–32)
CREAT SERPL-MCNC: 1.13 MG/DL (ref 0.6–1.3)
GLOBULIN SER CALC-MCNC: 3.1 G/DL (ref 2–4)
GLUCOSE SERPL-MCNC: 96 MG/DL (ref 74–99)
HDLC SERPL-MCNC: 29 MG/DL (ref 40–60)
HDLC SERPL: 3.5 {RATIO} (ref 0–5)
LDLC SERPL CALC-MCNC: 47.2 MG/DL (ref 0–100)
LIPID PROFILE,FLP: ABNORMAL
POTASSIUM SERPL-SCNC: 3.8 MMOL/L (ref 3.5–5.5)
PROT SERPL-MCNC: 7.6 G/DL (ref 6.4–8.2)
SODIUM SERPL-SCNC: 139 MMOL/L (ref 136–145)
TRIGL SERPL-MCNC: 124 MG/DL (ref ?–150)
VLDLC SERPL CALC-MCNC: 24.8 MG/DL

## 2018-12-19 PROCEDURE — 80053 COMPREHEN METABOLIC PANEL: CPT

## 2018-12-19 PROCEDURE — 80061 LIPID PANEL: CPT

## 2018-12-19 NOTE — PATIENT INSTRUCTIONS
I will send you an e-mail with any recommendations about the lab results. Continue efforts at weight loss. Include some strengthening exercise workouts.

## 2018-12-19 NOTE — PROGRESS NOTES
HISTORY OF PRESENT ILLNESS  Denia Caldwell is a 29 y.o. male. Felicitas Lyn is here for his 3 month follow up. He's off of all of his psych meds and feels good. He has lost 29 lbs since last visit, mostly by eating less. He does ride his bike to and from [place] but the [employer] club never materialized in the fall, but he will do that in the spring. Review of Systems   Constitutional: Negative for chills and fever. HENT: Negative for congestion, ear pain and sore throat. Eyes: Negative for discharge and redness. Respiratory: Negative for cough and shortness of breath. Cardiovascular: Negative for chest pain, palpitations and orthopnea. Gastrointestinal: Negative for abdominal pain, nausea and vomiting. Genitourinary: Negative for frequency and urgency. Skin: Negative for rash. Neurological: Negative for weakness and headaches. Endo/Heme/Allergies: Does not bruise/bleed easily. Physical Exam   Constitutional: He is oriented to person, place, and time. He appears well-developed and well-nourished. Eyes: Pupils are equal, round, and reactive to light. Neck: Neck supple. No thyromegaly present. Cardiovascular: Normal rate, regular rhythm and normal heart sounds. Pulmonary/Chest: Effort normal and breath sounds normal. No respiratory distress. He has no wheezes. He has no rales. Abdominal: Soft. He exhibits no distension. There is no tenderness. There is no rebound. Musculoskeletal: He exhibits no tenderness. Lymphadenopathy:     He has no cervical adenopathy. Neurological: He is alert and oriented to person, place, and time. Skin: No erythema. Psychiatric: He has a normal mood and affect. His behavior is normal.   Nursing note and vitals reviewed. ASSESSMENT and PLAN    ICD-10-CM ICD-9-CM    1. Metabolic syndrome G86.97 865.2 METABOLIC PANEL, COMPREHENSIVE      LIPID PANEL   2. Obesity (BMI 30.0-34. 9) M94.2 549.89 METABOLIC PANEL, COMPREHENSIVE      LIPID PANEL

## 2018-12-19 NOTE — PROGRESS NOTES
Rm;2    Chief Complaint   Patient presents with    Other     Metabolic Syndrome 6 month f/u     Depression Screening:  PHQ over the last two weeks 12/19/2018 9/5/2018 3/12/2018 12/11/2017 9/18/2017 6/19/2017 1/4/2017   Little interest or pleasure in doing things Not at all Not at all Not at all Not at all Not at all Not at all Not at all   Feeling down, depressed, irritable, or hopeless Not at all Not at all Not at all Not at all Not at all Not at all Not at all   Total Score PHQ 2 0 0 0 0 0 0 0       Learning Assessment:  Learning Assessment 6/19/2017 8/23/2016 3/30/2016   PRIMARY LEARNER Patient Patient Patient   HIGHEST LEVEL OF EDUCATION - PRIMARY LEARNER  SOME COLLEGE - [id number] [facility] LEARNER NONE - [address] CAREGIVER No - No   PRIMARY LANGUAGE ENGLISH ENGLISH ENGLISH   LEARNER PREFERENCE PRIMARY DEMONSTRATION READING DEMONSTRATION   ANSWERED BY PAtient Patient patient   RELATIONSHIP SELF SELF SELF       Abuse Screening:  No flowsheet data found. Health Maintenance reviewed and discussed per provider: yes     Coordination of Care:    1. Have you been to the ER, urgent care clinic since your last visit? Hospitalized since your last visit? no    2. Have you seen or consulted any other health care providers outside of the [address] [place] [address] since your last visit? Include any pap smears or colon screening.  no

## 2019-02-08 RX ORDER — FENOFIBRATE 48 MG/1
TABLET, COATED ORAL
Qty: 90 TAB | Refills: 0 | Status: SHIPPED | OUTPATIENT
Start: 2019-02-08 | End: 2019-05-29 | Stop reason: SDUPTHER

## 2019-02-08 RX ORDER — METFORMIN HYDROCHLORIDE 500 MG/1
TABLET ORAL
Qty: 90 TAB | Refills: 0 | Status: SHIPPED | OUTPATIENT
Start: 2019-02-08 | End: 2019-06-26 | Stop reason: SDUPTHER

## 2019-02-08 RX ORDER — LISINOPRIL 10 MG/1
TABLET ORAL
Qty: 90 TAB | Refills: 0 | Status: SHIPPED | OUTPATIENT
Start: 2019-02-08 | End: 2019-03-20 | Stop reason: SDUPTHER

## 2019-03-22 RX ORDER — LISINOPRIL 10 MG/1
TABLET ORAL
Qty: 90 TAB | Refills: 0 | Status: SHIPPED | OUTPATIENT
Start: 2019-03-22 | End: 2019-07-29

## 2019-05-29 RX ORDER — FENOFIBRATE 48 MG/1
TABLET, COATED ORAL
Qty: 90 TAB | Refills: 0 | Status: SHIPPED | OUTPATIENT
Start: 2019-05-29 | End: 2019-07-29

## 2019-06-19 ENCOUNTER — HOSPITAL ENCOUNTER (OUTPATIENT)
Dept: LAB | Age: 34
Discharge: HOME | End: 2019-06-19
Payer: MEDICAID

## 2019-06-19 ENCOUNTER — OFFICE VISIT (OUTPATIENT)
Dept: INTERNAL MEDICINE CLINIC | Age: 34
End: 2019-06-19

## 2019-06-19 VITALS
HEIGHT: 74 IN | SYSTOLIC BLOOD PRESSURE: 124 MMHG | RESPIRATION RATE: 17 BRPM | DIASTOLIC BLOOD PRESSURE: 87 MMHG | OXYGEN SATURATION: 99 % | TEMPERATURE: 98.6 F | BODY MASS INDEX: 26.18 KG/M2 | HEART RATE: 96 BPM | WEIGHT: 204 LBS

## 2019-06-19 DIAGNOSIS — I10 BENIGN HYPERTENSION WITHOUT CHF: ICD-10-CM

## 2019-06-19 DIAGNOSIS — E88.81 METABOLIC SYNDROME: ICD-10-CM

## 2019-06-19 DIAGNOSIS — R73.03 PREDIABETES: ICD-10-CM

## 2019-06-19 DIAGNOSIS — E78.5 DYSLIPIDEMIA: Primary | ICD-10-CM

## 2019-06-19 DIAGNOSIS — E78.5 DYSLIPIDEMIA: ICD-10-CM

## 2019-06-19 LAB
ALBUMIN SERPL-MCNC: 4.5 G/DL (ref 3.4–5)
ALBUMIN/GLOB SERPL: 1.5 {RATIO} (ref 0.8–1.7)
ALP SERPL-CCNC: 63 U/L (ref 45–117)
ALT SERPL-CCNC: 17 U/L (ref 16–61)
ANION GAP SERPL CALC-SCNC: 11 MMOL/L (ref 3–18)
AST SERPL-CCNC: 10 U/L (ref 15–37)
BILIRUB SERPL-MCNC: 1.3 MG/DL (ref 0.2–1)
BUN SERPL-MCNC: 8 MG/DL (ref 7–18)
BUN/CREAT SERPL: 8 (ref 12–20)
CALCIUM SERPL-MCNC: 9.4 MG/DL (ref 8.5–10.1)
CHLORIDE SERPL-SCNC: 102 MMOL/L (ref 100–108)
CHOLEST SERPL-MCNC: 141 MG/DL
CO2 SERPL-SCNC: 26 MMOL/L (ref 21–32)
CREAT SERPL-MCNC: 0.96 MG/DL (ref 0.6–1.3)
ERYTHROCYTE [DISTWIDTH] IN BLOOD BY AUTOMATED COUNT: 12.7 % (ref 11.6–14.5)
EST. AVERAGE GLUCOSE BLD GHB EST-MCNC: 97 MG/DL
GLOBULIN SER CALC-MCNC: 3.1 G/DL (ref 2–4)
GLUCOSE SERPL-MCNC: 92 MG/DL (ref 74–99)
HBA1C MFR BLD: 5 % (ref 4.2–5.6)
HCT VFR BLD AUTO: 44.1 % (ref 36–48)
HDLC SERPL-MCNC: 38 MG/DL (ref 40–60)
HDLC SERPL: 3.7 {RATIO} (ref 0–5)
HGB BLD-MCNC: 15.2 G/DL (ref 13–16)
LDLC SERPL CALC-MCNC: 87.2 MG/DL (ref 0–100)
LIPID PROFILE,FLP: ABNORMAL
MCH RBC QN AUTO: 31 PG (ref 24–34)
MCHC RBC AUTO-ENTMCNC: 34.5 G/DL (ref 31–37)
MCV RBC AUTO: 90 FL (ref 74–97)
PLATELET # BLD AUTO: 342 K/UL (ref 135–420)
PMV BLD AUTO: 9.3 FL (ref 9.2–11.8)
POTASSIUM SERPL-SCNC: 4 MMOL/L (ref 3.5–5.5)
PROT SERPL-MCNC: 7.6 G/DL (ref 6.4–8.2)
RBC # BLD AUTO: 4.9 M/UL (ref 4.7–5.5)
SODIUM SERPL-SCNC: 139 MMOL/L (ref 136–145)
TRIGL SERPL-MCNC: 79 MG/DL (ref ?–150)
VLDLC SERPL CALC-MCNC: 15.8 MG/DL
WBC # BLD AUTO: 7.3 K/UL (ref 4.6–13.2)

## 2019-06-19 PROCEDURE — 83036 HEMOGLOBIN GLYCOSYLATED A1C: CPT

## 2019-06-19 PROCEDURE — 80061 LIPID PANEL: CPT

## 2019-06-19 PROCEDURE — 85027 COMPLETE CBC AUTOMATED: CPT

## 2019-06-19 PROCEDURE — 80053 COMPREHEN METABOLIC PANEL: CPT

## 2019-06-19 PROCEDURE — 36415 COLL VENOUS BLD VENIPUNCTURE: CPT

## 2019-06-19 NOTE — PROGRESS NOTES
Chief Complaint   Patient presents with    [facility]       HPI:     Juan Liu is a 29 y.o.  male with history of metabolic syndrome, prediabetes, hypertension here for the above complaint. He developed prediabetes from psychiatric medications. He was 200 lbs and then became 300lbs due to psychiatric medications. He was put on medications to control this. He said he was 296 lbs and now 204. Due to weight gain, he had to be on medications for prediabetes, hypertension and lipids. He denies any chest pain, shortness of breath, abdominal pain, headaches or dizziness. He wants to get off all these medications. Past Medical History:   Diagnosis Date    Benign hypertension     Dyslipidemia     Metabolic syndrome     Prediabetes     Psychotic disorder ([facility] [address]     bipolar disorder II, schizophrenia. Dr. Zelalem Kahn     Past Surgical History:   Procedure Laterality Date    HX HEENT      HX HERNIA REPAIR       Current Outpatient Medications   Medication Sig    fenofibrate nanocrystallized (TRICOR) 48 mg tablet TAKE 1 TABLET BY MOUTH AT BEDTIME    lisinopril (PRINIVIL, ZESTRIL) 10 mg tablet TAKE 1 TABLET BY MOUTH DAILY    metFORMIN (GLUCOPHAGE) 500 mg tablet TAKE 1 TABLET BY MOUTH DAILY WITH BREAKFAST    divalproex DR (DEPAKOTE) 500 mg tablet Take one po at pm    cholecalciferol (VITAMIN D3) 1,000 unit tablet Take 1,000 Units by mouth every seven (7) days. No current facility-administered medications for this visit. Health Maintenance   Topic Date Due    Pneumococcal 0-64 years (1 of 1 - PPSV23) 12/10/1992    Influenza Age 5 to Adult  08/01/2019    DTaP/Tdap/Td series (2 - Td) 03/22/2028     Immunization History   Administered Date(s) Administered    Influenza Vaccine (Quad) PF 12/11/2017, 10/17/2018    Tdap 03/22/2018     No LMP for male patient.         Allergies and Intolerances:   No Known Allergies    Family History:   Family History   Problem Relation Age of Onset    No Known Problems Mother     No Known Problems Father        Social History:   He  reports that he has been smoking cigarettes. He has never used smokeless tobacco.  He  reports that he drinks alcohol. ROS:  · General: negative for - chills, fever, weight changes or malaise, night sweats  · HEENT: no sore throat, nasal congestion,  vision problems or ear problems  · Respiratory: no cough, shortness of breath, or wheezing  · Cardiovascular: no chest pain, palpitations, or dyspnea on exertion; no orthopnea or PND  · Gastrointestinal: no abdominal pain, N/V, change in bowel habits,  black or bloody stools, constipation or diarrhea  · Musculoskeletal: no back pain, joint pain, joint stiffness, muscle pain or muscle weakness  · Neurological: no numbness, tingling, headache or dizziness  · Psychological: negative for - anxiety, depression, sleep disturbances, suicidal or homicidal ideations    Objective:   Physical exam:   Visit Vitals  /87 (BP 1 Location: Left arm, BP Patient Position: Sitting)   Pulse 96   Temp 98.6 °F (37 °C) (Oral)   Resp 17   Ht 6' 2\" (1.88 m)   Wt 204 lb (92.5 kg)   SpO2 99%   BMI 26.19 kg/m²        Generally: Pleasant male in no acute distress  HEENT Exam: Head: atraumatic               Eyes: PERRLA    Ears: bilaterally normal TM, no erythema or exudate, normal light reflex    Nares: moist mucosa, no erythema    Mouth: Clear, no erythema or exudate    Neck: supple, no LAD, negative carotid bruits bilaterally  Cardiac Exam: regular, rate, and rhythm. Normal S1 and S2. No murmurs, gallops, or rubs  Pulmonary exam: Clear to auscultation bilaterally  Abdominal exam: Positive bowel sounds in all four quadrants, soft, nondistended, nontender  Extremities: 2+ dorsalis pedis pulses bilaterally.  No pedal edema    bilaterally    LABS/Radiological Tests:  Lab Results   Component Value Date/Time    WBC 6.0 05/14/2018 02:29 PM    HGB 14.9 05/14/2018 02:29 PM    HCT 44.3 05/14/2018 02:29 PM PLATELET 041 02/24/9253 02:29 PM     Lab Results   Component Value Date/Time    Sodium 139 12/19/2018 10:55 AM    Potassium 3.8 12/19/2018 10:55 AM    Chloride 105 12/19/2018 10:55 AM    CO2 27 12/19/2018 10:55 AM    Glucose 96 12/19/2018 10:55 AM    BUN 15 12/19/2018 10:55 AM    Creatinine 1.13 12/19/2018 10:55 AM     Lab Results   Component Value Date/Time    Cholesterol, total 101 12/19/2018 10:55 AM    HDL Cholesterol 29 (L) 12/19/2018 10:55 AM    LDL, calculated 47.2 12/19/2018 10:55 AM    Triglyceride 124 12/19/2018 10:55 AM     No results found for: GPT  Component      Latest Ref Rng & Units 5/14/2018           2:32 PM   Hemoglobin A1c, (calculated)      4.2 - 5.6 % 5.3     Previous labs      ASSESSMENT/PLAN:    1. Dyslipidemia:we will see what the labs show. Continue tricor, diet and exercise. -     METABOLIC PANEL, COMPREHENSIVE; Future  -     LIPID PANEL; Future    2. Benign hypertension without CHF: stable. Continue the lisinopril,diet and exercise. -     CBC W/O DIFF; Future    3. Prediabetes:we will see what the labs show. Continue diet, exercise and metformin.   -     HEMOGLOBIN A1C WITH EAG; Future    4. Metabolic syndrome: seems to have resolved. Will monitor. 5. Patient verbalized understanding and agreement with the plan. 6. Patient was given an after-visit summary. 7.   Follow-up and Dispositions    Return in about 2 weeks (around 7/3/2019) for f/u to discuss lab work or sooner if worsening symptoms.   ·                     Kaci Potts MD

## 2019-06-19 NOTE — PROGRESS NOTES
ROOM # 1  Identified pt with two pt identifiers(name and ). Reviewed record in preparation for visit and have obtained necessary documentation. Chief Complaint   Patient presents with   [address] preferred language for health care discussion is english/other. Is the patient using any DME equipment during OV? NO    Odalis Greenberg is due for:  Health Maintenance Due   Topic    Pneumococcal 0-64 years (1 of 1 - PPSV23)     Health Maintenance reviewed and discussed per provider  Please order/place referral if appropriate. Advance Directive:  1. Do you have an advance directive in place? Patient Reply: NO    2. If not, would you like material regarding how to put one in place? NO    Coordination of Care:  1. Have you been to the ER, urgent care clinic since your last visit? Hospitalized since your last visit? NO    2. Have you seen or consulted any other health care providers outside of the [address] [place] [address] since your last visit? Include any pap smears or colon screening. NO    Patient is accompanied by self I have received verbal consent from Odalis Greenberg to discuss any/all medical information while they are present in the room.     Learning Assessment:  Learning Assessment 2017 2016 3/30/2016   PRIMARY LEARNER Patient Patient Patient   HIGHEST LEVEL OF EDUCATION - PRIMARY LEARNER  SOME COLLEGE - 4 YEARS OF COLLEGE   BARRIERS PRIMARY LEARNER NONE - NONE   CO-LEARNER CAREGIVER No - No   PRIMARY LANGUAGE ENGLISH ENGLISH ENGLISH   LEARNER PREFERENCE PRIMARY DEMONSTRATION READING DEMONSTRATION   ANSWERED BY PAtient Patient patient   RELATIONSHIP SELF SELF SELF     Depression Screening:  3 most recent [facility] Screens 2018 2018 3/12/2018 2017 2017 2017 2017   Little interest or pleasure in doing things Not at all Not at all Not at all Not at all Not at all Not at all Not at all   Feeling down, depressed, irritable, or hopeless Not at all Not at all Not at all Not at all Not at all Not at all Not at all   Total Score PHQ 2 0 0 0 0 0 0 0     Abuse Screening:  No flowsheet data found. Fall Risk  Fall Risk Assessment, last 12 mths 8/23/2016   Able to walk? Yes   Fall in past 12 months?  No

## 2019-06-26 RX ORDER — METFORMIN HYDROCHLORIDE 500 MG/1
TABLET ORAL
Qty: 90 TAB | Refills: 0 | Status: SHIPPED | OUTPATIENT
Start: 2019-06-26 | End: 2019-07-29

## 2019-07-29 ENCOUNTER — OFFICE VISIT (OUTPATIENT)
Dept: INTERNAL MEDICINE CLINIC | Age: 34
End: 2019-07-29

## 2019-07-29 VITALS
DIASTOLIC BLOOD PRESSURE: 78 MMHG | OXYGEN SATURATION: 98 % | RESPIRATION RATE: 17 BRPM | SYSTOLIC BLOOD PRESSURE: 113 MMHG | BODY MASS INDEX: 26.69 KG/M2 | TEMPERATURE: 98.4 F | HEIGHT: 74 IN | HEART RATE: 89 BPM | WEIGHT: 208 LBS

## 2019-07-29 DIAGNOSIS — I10 BENIGN HYPERTENSION WITHOUT CHF: Primary | ICD-10-CM

## 2019-07-29 DIAGNOSIS — E78.5 DYSLIPIDEMIA: ICD-10-CM

## 2019-07-29 DIAGNOSIS — R73.03 PREDIABETES: ICD-10-CM

## 2019-07-29 NOTE — PROGRESS NOTES
Chief Complaint   Patient presents with    Labs       HPI:     Justin Meneses is a 29 y.o.  male with history of hypertension and dyslipidemia here for the above complaint. He has stopped the metformin, lisinopril, and tricor. He denies any chest pain, shortness of breath, abdominal pain, headaches or dizziness. He has stopped metformin, tricor and lisinopril for the last month. He is working on diet and exercise and has lot of weight. Past Medical History:   Diagnosis Date    Benign hypertension     Dyslipidemia     Metabolic syndrome     Prediabetes     Psychotic disorder ([facility] [address]     bipolar disorder II, schizophrenia. Dr. Niall Beth     Past Surgical History:   Procedure Laterality Date    HX HEENT      HX HERNIA REPAIR       Current Outpatient Medications   Medication Sig    divalproex DR (DEPAKOTE) 500 mg tablet Take one po at pm     No current facility-administered medications for this visit. Health Maintenance   Topic Date Due    Pneumococcal 0-64 years (1 of 1 - PPSV23) 12/10/1992    Influenza Age 5 to Adult  08/01/2019    DTaP/Tdap/Td series (2 - Td) 03/22/2028     Immunization History   Administered Date(s) Administered    Influenza Vaccine (Quad) PF 12/11/2017, 10/17/2018    Tdap 03/22/2018     No LMP for male patient. Allergies and Intolerances:   No Known Allergies    Family History:   Family History   Problem Relation Age of Onset    No Known Problems Mother     No Known Problems Father        Social History:   He  reports that he has been smoking cigarettes. He has never used smokeless tobacco.  He  reports that he drinks alcohol.         Objective:   Physical exam:   Visit Vitals  /78 (BP 1 Location: Left arm, BP Patient Position: Sitting)   Pulse 89   Temp 98.4 °F (36.9 °C) (Oral)   Resp 17   Ht 6' 2\" (1.88 m)   Wt 208 lb (94.3 kg)   SpO2 98%   BMI 26.71 kg/m²        Generally: Pleasant male in no acute distress  Cardiac Exam: regular, rate, and rhythm. Normal S1 and S2. No murmurs, gallops, or rubs  Pulmonary exam: Clear to auscultation bilaterally    LABS/Radiological Tests:  Component      Latest Ref Rng & Units 6/19/2019 6/19/2019 6/19/2019 6/19/2019          11:00 AM 11:00 AM 11:00 AM 11:00 AM   WBC      4.6 - 13.2 K/uL    7.3   RBC      4.70 - 5.50 M/uL    4.90   HGB      13.0 - 16.0 g/dL    15.2   HCT      36.0 - 48.0 %    44.1   MCV      74.0 - 97.0 FL    90.0   MCH      24.0 - 34.0 PG    31.0   MCHC      31.0 - 37.0 g/dL    34.5   RDW      11.6 - 14.5 %    12.7   PLATELET      997 - 741 K/uL    342   MPV      9.2 - 11.8 FL    9.3   NEUTROPHILS      40 - 73 %       LYMPHOCYTES      21 - 52 %       MONOCYTES      3 - 10 %       EOSINOPHILS      0 - 5 %       BASOPHILS      0 - 2 %       ABS. NEUTROPHILS      1.8 - 8.0 K/UL       ABS. LYMPHOCYTES      0.9 - 3.6 K/UL       ABS. MONOCYTES      0.05 - 1.2 K/UL       ABS. EOSINOPHILS      0.0 - 0.4 K/UL       ABS. BASOPHILS      0.0 - 0.06 K/UL       DF             Sodium      136 - 145 mmol/L  139     Potassium      3.5 - 5.5 mmol/L  4.0     Chloride      100 - 108 mmol/L  102     CO2      21 - 32 mmol/L  26     Anion gap      3.0 - 18 mmol/L  11     Glucose      74 - 99 mg/dL  92     BUN      7.0 - 18 MG/DL  8     Creatinine      0.6 - 1.3 MG/DL  0.96     BUN/Creatinine ratio      12 - 20    8 (L)     GFR est AA      >60 ml/min/1.73m2  >60     GFR est non-AA      >60 ml/min/1.73m2  >60     Calcium      8.5 - 10.1 MG/DL  9.4     Bilirubin, total      0.2 - 1.0 MG/DL  1.3 (H)     ALT (SGPT)      16 - 61 U/L  17     AST      15 - 37 U/L  10 (L)     Alk.  phosphatase      45 - 117 U/L  63     Protein, total      6.4 - 8.2 g/dL  7.6     Albumin      3.4 - 5.0 g/dL  4.5     Globulin      2.0 - 4.0 g/dL  3.1     A-G Ratio      0.8 - 1.7    1.5     Color             Appearance             Specific gravity      1.005 - 1.030         pH (UA)      5.0 - 8.0         Protein      NEG mg/dL       Glucose      NEG mg/dL       Ketone      NEG mg/dL       Bilirubin      NEG         Blood      NEG         Urobilinogen      0.2 - 1.0 EU/dL       Nitrites      NEG         Leukocyte Esterase      NEG         Cholesterol, total      <200 MG/      Triglyceride      <150 MG/DL 79      HDL Cholesterol      40 - 60 MG/DL 38 (L)      LDL, calculated      0 - 100 MG/DL 87.2      VLDL, calculated      MG/DL 15.8      CHOL/HDL Ratio      0 - 5.0   3.7      WBC      0 - 4 /hpf       RBC      0 - 5 /hpf       Epithelial cells      0 - 5 /lpf       Bacteria      NEG /hpf       Hemoglobin A1c, (calculated)      4.2 - 5.6 %   5.0    Est. average glucose      mg/dL   97    Triiodothyronine (T3), free      2.18 - 3.98 PG/ML       TSH      0.36 - 3.74 uIU/mL       Valproic acid      50 - 100 ug/ml       Vitamin D 25-Hydroxy      30 - 100 ng/mL         Component      Latest Ref Rng & Units 12/19/2018 12/19/2018 5/14/2018 5/14/2018          10:55 AM 10:55 AM  2:32 PM  2:31 PM   WBC      4.6 - 13.2 K/uL       RBC      4.70 - 5.50 M/uL       HGB      13.0 - 16.0 g/dL       HCT      36.0 - 48.0 %       MCV      74.0 - 97.0 FL       MCH      24.0 - 34.0 PG       MCHC      31.0 - 37.0 g/dL       RDW      11.6 - 14.5 %       PLATELET      868 - 008 K/uL       MPV      9.2 - 11.8 FL       NEUTROPHILS      40 - 73 %       LYMPHOCYTES      21 - 52 %       MONOCYTES      3 - 10 %       EOSINOPHILS      0 - 5 %       BASOPHILS      0 - 2 %       ABS. NEUTROPHILS      1.8 - 8.0 K/UL       ABS. LYMPHOCYTES      0.9 - 3.6 K/UL       ABS. MONOCYTES      0.05 - 1.2 K/UL       ABS. EOSINOPHILS      0.0 - 0.4 K/UL       ABS.  BASOPHILS      0.0 - 0.06 K/UL       DF             Sodium      136 - 145 mmol/L  139     Potassium      3.5 - 5.5 mmol/L  3.8     Chloride      100 - 108 mmol/L  105     CO2      21 - 32 mmol/L  27     Anion gap      3.0 - 18 mmol/L  7     Glucose      74 - 99 mg/dL  96     BUN      7.0 - 18 MG/DL  15     Creatinine      0.6 - 1.3 MG/DL 1. 13     BUN/Creatinine ratio      12 - 20    13     GFR est AA      >60 ml/min/1.73m2  >60     GFR est non-AA      >60 ml/min/1.73m2  >60     Calcium      8.5 - 10.1 MG/DL  9.7     Bilirubin, total      0.2 - 1.0 MG/DL  0.7     ALT (SGPT)      16 - 61 U/L  47     AST      15 - 37 U/L  20     Alk.  phosphatase      45 - 117 U/L  53     Protein, total      6.4 - 8.2 g/dL  7.6     Albumin      3.4 - 5.0 g/dL  4.5     Globulin      2.0 - 4.0 g/dL  3.1     A-G Ratio      0.8 - 1.7    1.5     Color             Appearance             Specific gravity      1.005 - 1.030         pH (UA)      5.0 - 8.0         Protein      NEG mg/dL       Glucose      NEG mg/dL       Ketone      NEG mg/dL       Bilirubin      NEG         Blood      NEG         Urobilinogen      0.2 - 1.0 EU/dL       Nitrites      NEG         Leukocyte Esterase      NEG         Cholesterol, total      <200 MG/      Triglyceride      <150 MG/      HDL Cholesterol      40 - 60 MG/DL 29 (L)      LDL, calculated      0 - 100 MG/DL 47.2      VLDL, calculated      MG/DL 24.8      CHOL/HDL Ratio      0 - 5.0   3.5      WBC      0 - 4 /hpf       RBC      0 - 5 /hpf       Epithelial cells      0 - 5 /lpf       Bacteria      NEG /hpf       Hemoglobin A1c, (calculated)      4.2 - 5.6 %   5.3    Est. average glucose      mg/dL       Triiodothyronine (T3), free      2.18 - 3.98 PG/ML       TSH      0.36 - 3.74 uIU/mL       Valproic acid      50 - 100 ug/ml       Vitamin D 25-Hydroxy      30 - 100 ng/mL    37.2     Component      Latest Ref Rng & Units 5/14/2018 5/14/2018 5/14/2018 5/14/2018           2:29 PM  2:29 PM  2:29 PM  2:29 PM   WBC      4.6 - 13.2 K/uL       RBC      4.70 - 5.50 M/uL       HGB      13.0 - 16.0 g/dL       HCT      36.0 - 48.0 %       MCV      74.0 - 97.0 FL       MCH      24.0 - 34.0 PG       MCHC      31.0 - 37.0 g/dL       RDW      11.6 - 14.5 %       PLATELET      456 - 673 K/uL       MPV      9.2 - 11.8 FL       NEUTROPHILS      40 - 73 %       LYMPHOCYTES      21 - 52 %       MONOCYTES      3 - 10 %       EOSINOPHILS      0 - 5 %       BASOPHILS      0 - 2 %       ABS. NEUTROPHILS      1.8 - 8.0 K/UL       ABS. LYMPHOCYTES      0.9 - 3.6 K/UL       ABS. MONOCYTES      0.05 - 1.2 K/UL       ABS. EOSINOPHILS      0.0 - 0.4 K/UL       ABS. BASOPHILS      0.0 - 0.06 K/UL       DF             Sodium      136 - 145 mmol/L       Potassium      3.5 - 5.5 mmol/L       Chloride      100 - 108 mmol/L       CO2      21 - 32 mmol/L       Anion gap      3.0 - 18 mmol/L       Glucose      74 - 99 mg/dL       BUN      7.0 - 18 MG/DL       Creatinine      0.6 - 1.3 MG/DL       BUN/Creatinine ratio      12 - 20         GFR est AA      >60 ml/min/1.73m2       GFR est non-AA      >60 ml/min/1.73m2       Calcium      8.5 - 10.1 MG/DL       Bilirubin, total      0.2 - 1.0 MG/DL       ALT (SGPT)      16 - 61 U/L       AST      15 - 37 U/L       Alk.  phosphatase      45 - 117 U/L       Protein, total      6.4 - 8.2 g/dL       Albumin      3.4 - 5.0 g/dL       Globulin      2.0 - 4.0 g/dL       A-G Ratio      0.8 - 1.7         Color         YELLOW    Appearance         CLEAR    Specific gravity      1.005 - 1.030     1.024    pH (UA)      5.0 - 8.0     8.0    Protein      NEG mg/dL   NEGATIVE    Glucose      NEG mg/dL   NEGATIVE    Ketone      NEG mg/dL   TRACE (A)    Bilirubin      NEG     NEGATIVE    Blood      NEG     NEGATIVE    Urobilinogen      0.2 - 1.0 EU/dL   1.0    Nitrites      NEG     NEGATIVE    Leukocyte Esterase      NEG     TRACE (A)    Cholesterol, total      <200 MG/DL       Triglyceride      <150 MG/DL       HDL Cholesterol      40 - 60 MG/DL       LDL, calculated      0 - 100 MG/DL       VLDL, calculated      MG/DL       CHOL/HDL Ratio      0 - 5.0         WBC      0 - 4 /hpf  0 to 3     RBC      0 - 5 /hpf  0     Epithelial cells      0 - 5 /lpf  FEW     Bacteria      NEG /hpf  NEGATIVE     Hemoglobin A1c, (calculated)      4.2 - 5.6 %       Est. average glucose      mg/dL       Triiodothyronine (T3), free      2.18 - 3.98 PG/ML       TSH      0.36 - 3.74 uIU/mL    2.66   Valproic acid      50 - 100 ug/ml 47 (L)      Vitamin D 25-Hydroxy      30 - 100 ng/mL         Component      Latest Ref Rng & Units 5/14/2018 5/14/2018 5/14/2018 5/14/2018           2:29 PM  2:29 PM  2:29 PM  2:29 PM   WBC      4.6 - 13.2 K/uL    6.0   RBC      4.70 - 5.50 M/uL    4.82   HGB      13.0 - 16.0 g/dL    14.9   HCT      36.0 - 48.0 %    44.3   MCV      74.0 - 97.0 FL    91.9   MCH      24.0 - 34.0 PG    30.9   MCHC      31.0 - 37.0 g/dL    33.6   RDW      11.6 - 14.5 %    12.2   PLATELET      612 - 387 K/uL    294   MPV      9.2 - 11.8 FL    9.3   NEUTROPHILS      40 - 73 %    60   LYMPHOCYTES      21 - 52 %    32   MONOCYTES      3 - 10 %    6   EOSINOPHILS      0 - 5 %    1   BASOPHILS      0 - 2 %    1   ABS. NEUTROPHILS      1.8 - 8.0 K/UL    3.6   ABS. LYMPHOCYTES      0.9 - 3.6 K/UL    1.9   ABS. MONOCYTES      0.05 - 1.2 K/UL    0.4   ABS. EOSINOPHILS      0.0 - 0.4 K/UL    0.0   ABS. BASOPHILS      0.0 - 0.06 K/UL    0.0   DF          AUTOMATED   Sodium      136 - 145 mmol/L 142      Potassium      3.5 - 5.5 mmol/L 4.6      Chloride      100 - 108 mmol/L 106      CO2      21 - 32 mmol/L 28      Anion gap      3.0 - 18 mmol/L 8      Glucose      74 - 99 mg/dL 89      BUN      7.0 - 18 MG/DL 19 (H)      Creatinine      0.6 - 1.3 MG/DL 1.25      BUN/Creatinine ratio      12 - 20   15      GFR est AA      >60 ml/min/1.73m2 >60      GFR est non-AA      >60 ml/min/1.73m2 >60      Calcium      8.5 - 10.1 MG/DL 9.1      Bilirubin, total      0.2 - 1.0 MG/DL 0.5      ALT (SGPT)      16 - 61 U/L 38      AST      15 - 37 U/L 21      Alk.  phosphatase      45 - 117 U/L 51      Protein, total      6.4 - 8.2 g/dL 7.6      Albumin      3.4 - 5.0 g/dL 4.2      Globulin      2.0 - 4.0 g/dL 3.4      A-G Ratio      0.8 - 1.7   1.2      Color             Appearance             Specific gravity      1.005 - 1.030         pH (UA)      5.0 - 8.0         Protein      NEG mg/dL       Glucose      NEG mg/dL       Ketone      NEG mg/dL       Bilirubin      NEG         Blood      NEG         Urobilinogen      0.2 - 1.0 EU/dL       Nitrites      NEG         Leukocyte Esterase      NEG         Cholesterol, total      <200 MG/DL  128     Triglyceride      <150 MG/DL  231 (H)     HDL Cholesterol      40 - 60 MG/DL  22 (L)     LDL, calculated      0 - 100 MG/DL  59.8     VLDL, calculated      MG/DL  46.2     CHOL/HDL Ratio      0 - 5.0    5.8 (H)     WBC      0 - 4 /hpf       RBC      0 - 5 /hpf       Epithelial cells      0 - 5 /lpf       Bacteria      NEG /hpf       Hemoglobin A1c, (calculated)      4.2 - 5.6 %       Est. average glucose      mg/dL       Triiodothyronine (T3), free      2.18 - 3.98 PG/ML   3.5    TSH      0.36 - 3.74 uIU/mL       Valproic acid      50 - 100 ug/ml       Vitamin D 25-Hydroxy      30 - 100 ng/mL         Component      Latest Ref Rng & Units 4/16/2018           3:55 PM   WBC      4.6 - 13.2 K/uL 5.0   RBC      4.70 - 5.50 M/uL 4.73   HGB      13.0 - 16.0 g/dL 14.7   HCT      36.0 - 48.0 % 43.4   MCV      74.0 - 97.0 FL 91.8   MCH      24.0 - 34.0 PG 31.1   MCHC      31.0 - 37.0 g/dL 33.9   RDW      11.6 - 14.5 % 12.1   PLATELET      299 - 917 K/uL 272   MPV      9.2 - 11.8 FL 9.4   NEUTROPHILS      40 - 73 % 54   LYMPHOCYTES      21 - 52 % 38   MONOCYTES      3 - 10 % 7   EOSINOPHILS      0 - 5 % 1   BASOPHILS      0 - 2 % 0   ABS. NEUTROPHILS      1.8 - 8.0 K/UL 2.7   ABS. LYMPHOCYTES      0.9 - 3.6 K/UL 1.9   ABS. MONOCYTES      0.05 - 1.2 K/UL 0.4   ABS. EOSINOPHILS      0.0 - 0.4 K/UL 0.0   ABS.  BASOPHILS      0.0 - 0.06 K/UL 0.0   DF       AUTOMATED   Sodium      136 - 145 mmol/L    Potassium      3.5 - 5.5 mmol/L    Chloride      100 - 108 mmol/L    CO2      21 - 32 mmol/L    Anion gap      3.0 - 18 mmol/L    Glucose      74 - 99 mg/dL    BUN      7.0 - 18 MG/DL Creatinine      0.6 - 1.3 MG/DL    BUN/Creatinine ratio      12 - 20      GFR est AA      >60 ml/min/1.73m2    GFR est non-AA      >60 ml/min/1.73m2    Calcium      8.5 - 10.1 MG/DL    Bilirubin, total      0.2 - 1.0 MG/DL    ALT (SGPT)      16 - 61 U/L    AST      15 - 37 U/L    Alk. phosphatase      45 - 117 U/L    Protein, total      6.4 - 8.2 g/dL    Albumin      3.4 - 5.0 g/dL    Globulin      2.0 - 4.0 g/dL    A-G Ratio      0.8 - 1.7      Color          Appearance          Specific gravity      1.005 - 1.030      pH (UA)      5.0 - 8.0      Protein      NEG mg/dL    Glucose      NEG mg/dL    Ketone      NEG mg/dL    Bilirubin      NEG      Blood      NEG      Urobilinogen      0.2 - 1.0 EU/dL    Nitrites      NEG      Leukocyte Esterase      NEG      Cholesterol, total      <200 MG/DL    Triglyceride      <150 MG/DL    HDL Cholesterol      40 - 60 MG/DL    LDL, calculated      0 - 100 MG/DL    VLDL, calculated      MG/DL    CHOL/HDL Ratio      0 - 5.0      WBC      0 - 4 /hpf    RBC      0 - 5 /hpf    Epithelial cells      0 - 5 /lpf    Bacteria      NEG /hpf    Hemoglobin A1c, (calculated)      4.2 - 5.6 %    Est. average glucose      mg/dL    Triiodothyronine (T3), free      2.18 - 3.98 PG/ML    TSH      0.36 - 3.74 uIU/mL    Valproic acid      50 - 100 ug/ml    Vitamin D 25-Hydroxy      30 - 100 ng/mL    Previous labs      ASSESSMENT/PLAN:    1. Benign hypertension without CHF: stable off lisinopril. He will monitor. Continue to work on diet and exercise. 2. Prediabetes: stable. Continue to hold metformin and work on diet and exercise. 3. Dyslipidemia: stable. Continue to hold tricor and work on diet and exercise. 4. Patient verbalized understanding and agreement with the plan. 5. Patient was given an after-visit summary. 6.   Follow-up and Dispositions    · Return in about 6 months (around 1/29/2020) for f/u HTN/lipids  or sooner if worsening symptoms.                      Semret MD Mello

## 2019-07-29 NOTE — PROGRESS NOTES
ROOM # 1  Identified pt with two pt identifiers(name and ). Reviewed record in preparation for visit and have obtained necessary documentation. Chief Complaint   Patient presents with    Labs      Mir Vaughn preferred language for health care discussion is english/other. Is the patient using any DME equipment during OV? NO    Mir Vaughn is due for:  Health Maintenance Due   Topic    Pneumococcal 0-64 years (1 of 1 - PPSV23)     Health Maintenance reviewed and discussed per provider  Please order/place referral if appropriate. Advance Directive:  1. Do you have an advance directive in place? Patient Reply: NO    2. If not, would you like material regarding how to put one in place? NO    Coordination of Care:  1. Have you been to the ER, urgent care clinic since your last visit? Hospitalized since your last visit? NO    2. Have you seen or consulted any other health care providers outside of the [address] [place] [address] since your last visit? Include any pap smears or colon screening. NO    Patient is accompanied by self I have received verbal consent from Mir Roderick to discuss any/all medical information while they are present in the room.     Learning Assessment:  Learning Assessment 2017 2016 3/30/2016   PRIMARY LEARNER Patient Patient Patient   HIGHEST LEVEL OF EDUCATION - PRIMARY LEARNER  SOME COLLEGE - 4 YEARS OF COLLEGE   BARRIERS PRIMARY LEARNER NONE - NONE   CO-LEARNER CAREGIVER No - No   PRIMARY LANGUAGE ENGLISH ENGLISH ENGLISH   LEARNER PREFERENCE PRIMARY DEMONSTRATION READING DEMONSTRATION   ANSWERED BY PAtient Patient patient   RELATIONSHIP SELF SELF SELF     Depression Screening:  3 most recent [facility] Screens 2018 2018 3/12/2018 2017 2017 2017 2017   Little interest or pleasure in doing things Not at all Not at all Not at all Not at all Not at all Not at all Not at all   Feeling down, depressed, irritable, or hopeless Not at all Not at all Not at all Not at all Not at all Not at all Not at all   Total Score PHQ 2 0 0 0 0 0 0 0     Abuse Screening:  n/i  Fall Risk  Fall Risk Assessment, last 12 mths 8/23/2016   Able to walk? Yes   Fall in past 12 months?  No

## 2019-09-19 RX ORDER — METFORMIN HYDROCHLORIDE 500 MG/1
TABLET ORAL
Qty: 90 TAB | Refills: 1 | Status: SHIPPED | OUTPATIENT
Start: 2019-09-19

## 2020-08-28 NOTE — PROGRESS NOTES
Added [id number] in [id number]. [Follow - Up] : a follow-up visit [Hyperparathyroidism] : hyperparathyroidism [Other___] : [unfilled]